# Patient Record
Sex: FEMALE | Race: WHITE | Employment: OTHER | URBAN - METROPOLITAN AREA
[De-identification: names, ages, dates, MRNs, and addresses within clinical notes are randomized per-mention and may not be internally consistent; named-entity substitution may affect disease eponyms.]

---

## 2017-10-12 ENCOUNTER — OFFICE VISIT (OUTPATIENT)
Dept: INTERNAL MEDICINE CLINIC | Age: 62
End: 2017-10-12

## 2017-10-12 VITALS
BODY MASS INDEX: 23.27 KG/M2 | HEART RATE: 78 BPM | OXYGEN SATURATION: 96 % | DIASTOLIC BLOOD PRESSURE: 88 MMHG | WEIGHT: 132 LBS | SYSTOLIC BLOOD PRESSURE: 146 MMHG

## 2017-10-12 DIAGNOSIS — L28.2 PRURITIC RASH: ICD-10-CM
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
## 2017-10-12 DIAGNOSIS — E55.9 VITAMIN D DEFICIENCY: ICD-10-CM

## 2017-10-12 DIAGNOSIS — R23.8 VESICULAR RASH: Primary | ICD-10-CM

## 2017-10-12 PROCEDURE — 99213 OFFICE O/P EST LOW 20 MIN: CPT | Performed by: INTERNAL MEDICINE

## 2017-10-12 RX ORDER — MULTIVIT-MIN/IRON/FOLIC ACID/K 18-600-40
1 CAPSULE ORAL DAILY
COMMUNITY

## 2017-10-12 RX ORDER — TRIAMCINOLONE ACETONIDE 5 MG/G
CREAM TOPICAL
Qty: 1 TUBE | Refills: 1 | Status: SHIPPED | OUTPATIENT
Start: 2017-10-12 | End: 2017-10-19

## 2017-10-12 RX ORDER — PREDNISONE 20 MG/1
40 TABLET ORAL DAILY
Qty: 10 TABLET | Refills: 0 | Status: SHIPPED | OUTPATIENT
Start: 2017-10-12 | End: 2017-10-17

## 2017-10-13 ASSESSMENT — ENCOUNTER SYMPTOMS
WHEEZING: 0
DIARRHEA: 0
SORE THROAT: 0
ABDOMINAL PAIN: 0
VOMITING: 0
TROUBLE SWALLOWING: 0
SHORTNESS OF BREATH: 0
NAUSEA: 0

## 2017-10-13 NOTE — PROGRESS NOTES
is not present. No thyromegaly present. Cardiovascular: Normal rate, regular rhythm, normal heart sounds and intact distal pulses. Exam reveals no friction rub. No murmur heard. Pulmonary/Chest: Effort normal and breath sounds normal. No respiratory distress. Abdominal: Soft. Normal appearance and bowel sounds are normal. She exhibits no distension. There is no tenderness. Musculoskeletal: Normal range of motion. She exhibits no edema. Lymphadenopathy:     She has no cervical adenopathy. Neurological: She is alert and oriented to person, place, and time. She has normal strength and normal reflexes. No cranial nerve deficit or sensory deficit. Skin: Skin is warm and dry. No rash noted. She is not diaphoretic. No cyanosis. Nails show no clubbing. Psychiatric: She has a normal mood and affect. Her speech is normal and behavior is normal.       Assessment/Plan     1. Vesicular rash  - predniSONE (DELTASONE) 20 MG tablet; Take 2 tablets by mouth daily for 5 days  Dispense: 10 tablet; Refill: 0  - triamcinolone (ARISTOCORT) 0.5 % cream; Apply topically 3 times daily. Dispense: 1 Tube; Refill: 1    2. Pruritic rash  - predniSONE (DELTASONE) 20 MG tablet; Take 2 tablets by mouth daily for 5 days  Dispense: 10 tablet; Refill: 0  - triamcinolone (ARISTOCORT) 0.5 % cream; Apply topically 3 times daily. Dispense: 1 Tube; Refill: 1    3. Bug bite, initial encounter  - predniSONE (DELTASONE) 20 MG tablet; Take 2 tablets by mouth daily for 5 days  Dispense: 10 tablet; Refill: 0  - triamcinolone (ARISTOCORT) 0.5 % cream; Apply topically 3 times daily. Dispense: 1 Tube; Refill: 1    4. Vitamin D deficiency  - Cholecalciferol (VITAMIN D) 2000 units CAPS capsule; Take 1 capsule by mouth daily      No orders of the defined types were placed in this encounter. Return if symptoms worsen or fail to improve.     Artemio Persaud MD     10/13/2017  9:39 AM    Documentation was done using voice recognition dragon software. Every effort was made to ensure accuracy; however, inadvertent unintentional computerized transcription errors may be present.

## 2018-11-07 RX ORDER — LOSARTAN POTASSIUM 25 MG/1
25 TABLET ORAL
COMMUNITY
Start: 2018-07-06 | End: 2021-08-13

## 2018-11-07 RX ORDER — ATORVASTATIN CALCIUM 10 MG/1
10 TABLET, FILM COATED ORAL
COMMUNITY
Start: 2018-07-06

## 2018-11-27 ENCOUNTER — ANESTHESIA EVENT (OUTPATIENT)
Dept: ENDOSCOPY | Age: 63
End: 2018-11-27
Payer: COMMERCIAL

## 2018-11-28 ENCOUNTER — ANESTHESIA (OUTPATIENT)
Dept: ENDOSCOPY | Age: 63
End: 2018-11-28
Payer: COMMERCIAL

## 2018-11-28 ENCOUNTER — HOSPITAL ENCOUNTER (OUTPATIENT)
Age: 63
Setting detail: OUTPATIENT SURGERY
Discharge: HOME | End: 2018-11-28
Attending: INTERNAL MEDICINE | Admitting: INTERNAL MEDICINE
Payer: COMMERCIAL

## 2018-11-28 VITALS
TEMPERATURE: 97 F | OXYGEN SATURATION: 100 % | BODY MASS INDEX: 22.2 KG/M2 | SYSTOLIC BLOOD PRESSURE: 134 MMHG | DIASTOLIC BLOOD PRESSURE: 92 MMHG | HEART RATE: 67 BPM | WEIGHT: 130 LBS | RESPIRATION RATE: 16 BRPM | HEIGHT: 64 IN

## 2018-11-28 VITALS
SYSTOLIC BLOOD PRESSURE: 114 MMHG | DIASTOLIC BLOOD PRESSURE: 80 MMHG | RESPIRATION RATE: 20 BRPM | OXYGEN SATURATION: 100 %

## 2018-11-28 PROCEDURE — 2500000003 HC RX 250 WO HCPCS: Performed by: NURSE ANESTHETIST, CERTIFIED REGISTERED

## 2018-11-28 PROCEDURE — 7100000011 HC PHASE II RECOVERY - ADDTL 15 MIN: Performed by: INTERNAL MEDICINE

## 2018-11-28 PROCEDURE — 2580000003 HC RX 258: Performed by: NURSE ANESTHETIST, CERTIFIED REGISTERED

## 2018-11-28 PROCEDURE — 3700000000 HC ANESTHESIA ATTENDED CARE: Performed by: INTERNAL MEDICINE

## 2018-11-28 PROCEDURE — 7100000010 HC PHASE II RECOVERY - FIRST 15 MIN: Performed by: INTERNAL MEDICINE

## 2018-11-28 PROCEDURE — 3700000001 HC ADD 15 MINUTES (ANESTHESIA): Performed by: INTERNAL MEDICINE

## 2018-11-28 PROCEDURE — 3609009500 HC COLONOSCOPY DIAGNOSTIC OR SCREENING: Performed by: INTERNAL MEDICINE

## 2018-11-28 PROCEDURE — 6360000002 HC RX W HCPCS: Performed by: NURSE ANESTHETIST, CERTIFIED REGISTERED

## 2018-11-28 RX ORDER — OXYCODONE HYDROCHLORIDE 5 MG/1
10 TABLET ORAL PRN
Status: DISCONTINUED | OUTPATIENT
Start: 2018-11-28 | End: 2018-11-28 | Stop reason: HOSPADM

## 2018-11-28 RX ORDER — SODIUM CHLORIDE 9 MG/ML
INJECTION, SOLUTION INTRAVENOUS CONTINUOUS PRN
Status: DISCONTINUED | OUTPATIENT
Start: 2018-11-28 | End: 2018-11-28 | Stop reason: SDUPTHER

## 2018-11-28 RX ORDER — LIDOCAINE HYDROCHLORIDE 20 MG/ML
INJECTION, SOLUTION INFILTRATION; PERINEURAL PRN
Status: DISCONTINUED | OUTPATIENT
Start: 2018-11-28 | End: 2018-11-28 | Stop reason: SDUPTHER

## 2018-11-28 RX ORDER — PROPOFOL 10 MG/ML
INJECTION, EMULSION INTRAVENOUS PRN
Status: DISCONTINUED | OUTPATIENT
Start: 2018-11-28 | End: 2018-11-28 | Stop reason: SDUPTHER

## 2018-11-28 RX ORDER — LABETALOL HYDROCHLORIDE 5 MG/ML
5 INJECTION, SOLUTION INTRAVENOUS EVERY 10 MIN PRN
Status: DISCONTINUED | OUTPATIENT
Start: 2018-11-28 | End: 2018-11-28 | Stop reason: HOSPADM

## 2018-11-28 RX ORDER — OXYCODONE HYDROCHLORIDE 5 MG/1
5 TABLET ORAL PRN
Status: DISCONTINUED | OUTPATIENT
Start: 2018-11-28 | End: 2018-11-28 | Stop reason: HOSPADM

## 2018-11-28 RX ORDER — MEPERIDINE HYDROCHLORIDE 25 MG/ML
12.5 INJECTION INTRAMUSCULAR; INTRAVENOUS; SUBCUTANEOUS EVERY 5 MIN PRN
Status: DISCONTINUED | OUTPATIENT
Start: 2018-11-28 | End: 2018-11-28 | Stop reason: HOSPADM

## 2018-11-28 RX ORDER — PROMETHAZINE HYDROCHLORIDE 25 MG/ML
6.25 INJECTION, SOLUTION INTRAMUSCULAR; INTRAVENOUS
Status: DISCONTINUED | OUTPATIENT
Start: 2018-11-28 | End: 2018-11-28 | Stop reason: HOSPADM

## 2018-11-28 RX ORDER — SODIUM CHLORIDE 9 MG/ML
INJECTION, SOLUTION INTRAVENOUS CONTINUOUS
Status: DISCONTINUED | OUTPATIENT
Start: 2018-11-28 | End: 2018-11-28 | Stop reason: HOSPADM

## 2018-11-28 RX ORDER — MORPHINE SULFATE 4 MG/ML
1 INJECTION, SOLUTION INTRAMUSCULAR; INTRAVENOUS EVERY 5 MIN PRN
Status: DISCONTINUED | OUTPATIENT
Start: 2018-11-28 | End: 2018-11-28 | Stop reason: HOSPADM

## 2018-11-28 RX ORDER — SODIUM CHLORIDE 0.9 % (FLUSH) 0.9 %
10 SYRINGE (ML) INJECTION EVERY 12 HOURS SCHEDULED
Status: DISCONTINUED | OUTPATIENT
Start: 2018-11-28 | End: 2018-11-28 | Stop reason: HOSPADM

## 2018-11-28 RX ORDER — HYDRALAZINE HYDROCHLORIDE 20 MG/ML
5 INJECTION INTRAMUSCULAR; INTRAVENOUS EVERY 10 MIN PRN
Status: DISCONTINUED | OUTPATIENT
Start: 2018-11-28 | End: 2018-11-28 | Stop reason: HOSPADM

## 2018-11-28 RX ORDER — DIPHENHYDRAMINE HYDROCHLORIDE 50 MG/ML
12.5 INJECTION INTRAMUSCULAR; INTRAVENOUS
Status: DISCONTINUED | OUTPATIENT
Start: 2018-11-28 | End: 2018-11-28 | Stop reason: HOSPADM

## 2018-11-28 RX ORDER — METOCLOPRAMIDE HYDROCHLORIDE 5 MG/ML
10 INJECTION INTRAMUSCULAR; INTRAVENOUS
Status: DISCONTINUED | OUTPATIENT
Start: 2018-11-28 | End: 2018-11-28 | Stop reason: HOSPADM

## 2018-11-28 RX ORDER — SODIUM CHLORIDE 0.9 % (FLUSH) 0.9 %
10 SYRINGE (ML) INJECTION PRN
Status: DISCONTINUED | OUTPATIENT
Start: 2018-11-28 | End: 2018-11-28 | Stop reason: HOSPADM

## 2018-11-28 RX ADMIN — SODIUM CHLORIDE: 9 INJECTION, SOLUTION INTRAVENOUS at 11:20

## 2018-11-28 RX ADMIN — PROPOFOL 20 MG: 10 INJECTION, EMULSION INTRAVENOUS at 11:35

## 2018-11-28 RX ADMIN — LIDOCAINE HYDROCHLORIDE 50 MG: 20 INJECTION, SOLUTION INFILTRATION; PERINEURAL at 11:25

## 2018-11-28 RX ADMIN — PROPOFOL 20 MG: 10 INJECTION, EMULSION INTRAVENOUS at 11:54

## 2018-11-28 RX ADMIN — PROPOFOL 80 MG: 10 INJECTION, EMULSION INTRAVENOUS at 11:40

## 2018-11-28 RX ADMIN — PROPOFOL 180 MG: 10 INJECTION, EMULSION INTRAVENOUS at 11:25

## 2018-11-28 ASSESSMENT — PAIN SCALES - WONG BAKER: WONGBAKER_NUMERICALRESPONSE: 0

## 2018-11-28 ASSESSMENT — PAIN SCALES - GENERAL
PAINLEVEL_OUTOF10: 0
PAINLEVEL_OUTOF10: 0

## 2021-08-13 ENCOUNTER — APPOINTMENT (OUTPATIENT)
Dept: CT IMAGING | Age: 66
End: 2021-08-13
Payer: MEDICARE

## 2021-08-13 ENCOUNTER — HOSPITAL ENCOUNTER (OUTPATIENT)
Age: 66
Setting detail: OBSERVATION
Discharge: HOME | End: 2021-08-15
Attending: EMERGENCY MEDICINE | Admitting: INTERNAL MEDICINE
Payer: MEDICARE

## 2021-08-13 ENCOUNTER — APPOINTMENT (OUTPATIENT)
Dept: GENERAL RADIOLOGY | Age: 66
End: 2021-08-13
Payer: MEDICARE

## 2021-08-13 DIAGNOSIS — R55 SYNCOPE AND COLLAPSE: Primary | ICD-10-CM

## 2021-08-13 LAB
ANION GAP SERPL CALCULATED.3IONS-SCNC: 15 MMOL/L (ref 3–16)
BASOPHILS ABSOLUTE: 0 K/UL (ref 0–0.2)
BASOPHILS RELATIVE PERCENT: 0.4 %
BUN BLDV-MCNC: 14 MG/DL (ref 7–20)
CALCIUM SERPL-MCNC: 9 MG/DL (ref 8.3–10.6)
CHLORIDE BLD-SCNC: 94 MMOL/L (ref 99–110)
CO2: 22 MMOL/L (ref 21–32)
CREAT SERPL-MCNC: 0.6 MG/DL (ref 0.6–1.2)
EOSINOPHILS ABSOLUTE: 0.1 K/UL (ref 0–0.6)
EOSINOPHILS RELATIVE PERCENT: 1.1 %
GFR AFRICAN AMERICAN: >60
GFR NON-AFRICAN AMERICAN: >60
GLUCOSE BLD-MCNC: 133 MG/DL (ref 70–99)
HCT VFR BLD CALC: 36.2 % (ref 36–48)
HEMOGLOBIN: 12.7 G/DL (ref 12–16)
LYMPHOCYTES ABSOLUTE: 2.2 K/UL (ref 1–5.1)
LYMPHOCYTES RELATIVE PERCENT: 35.9 %
MAGNESIUM: 2.5 MG/DL (ref 1.8–2.4)
MCH RBC QN AUTO: 33 PG (ref 26–34)
MCHC RBC AUTO-ENTMCNC: 35.1 G/DL (ref 31–36)
MCV RBC AUTO: 94.1 FL (ref 80–100)
MONOCYTES ABSOLUTE: 0.5 K/UL (ref 0–1.3)
MONOCYTES RELATIVE PERCENT: 8.8 %
NEUTROPHILS ABSOLUTE: 3.3 K/UL (ref 1.7–7.7)
NEUTROPHILS RELATIVE PERCENT: 53.8 %
PDW BLD-RTO: 13.1 % (ref 12.4–15.4)
PLATELET # BLD: 271 K/UL (ref 135–450)
PMV BLD AUTO: 7.1 FL (ref 5–10.5)
POTASSIUM REFLEX MAGNESIUM: 3.5 MMOL/L (ref 3.5–5.1)
RBC # BLD: 3.84 M/UL (ref 4–5.2)
SODIUM BLD-SCNC: 131 MMOL/L (ref 136–145)
TROPONIN: <0.01 NG/ML
WBC # BLD: 6.1 K/UL (ref 4–11)

## 2021-08-13 PROCEDURE — 2580000003 HC RX 258: Performed by: EMERGENCY MEDICINE

## 2021-08-13 PROCEDURE — 99284 EMERGENCY DEPT VISIT MOD MDM: CPT

## 2021-08-13 PROCEDURE — 6360000004 HC RX CONTRAST MEDICATION: Performed by: EMERGENCY MEDICINE

## 2021-08-13 PROCEDURE — 83735 ASSAY OF MAGNESIUM: CPT

## 2021-08-13 PROCEDURE — 71260 CT THORAX DX C+: CPT

## 2021-08-13 PROCEDURE — 80048 BASIC METABOLIC PNL TOTAL CA: CPT

## 2021-08-13 PROCEDURE — 93005 ELECTROCARDIOGRAM TRACING: CPT | Performed by: EMERGENCY MEDICINE

## 2021-08-13 PROCEDURE — 70450 CT HEAD/BRAIN W/O DYE: CPT

## 2021-08-13 PROCEDURE — 85025 COMPLETE CBC W/AUTO DIFF WBC: CPT

## 2021-08-13 PROCEDURE — 71045 X-RAY EXAM CHEST 1 VIEW: CPT

## 2021-08-13 PROCEDURE — 36415 COLL VENOUS BLD VENIPUNCTURE: CPT

## 2021-08-13 PROCEDURE — G0378 HOSPITAL OBSERVATION PER HR: HCPCS

## 2021-08-13 PROCEDURE — 84484 ASSAY OF TROPONIN QUANT: CPT

## 2021-08-13 RX ORDER — 0.9 % SODIUM CHLORIDE 0.9 %
500 INTRAVENOUS SOLUTION INTRAVENOUS ONCE
Status: COMPLETED | OUTPATIENT
Start: 2021-08-13 | End: 2021-08-14

## 2021-08-13 RX ORDER — OLMESARTAN MEDOXOMIL 20 MG/1
5 TABLET ORAL DAILY
COMMUNITY
Start: 2021-07-18 | End: 2022-10-19 | Stop reason: DRUGHIGH

## 2021-08-13 RX ADMIN — IOPAMIDOL 75 ML: 755 INJECTION, SOLUTION INTRAVENOUS at 21:59

## 2021-08-13 RX ADMIN — SODIUM CHLORIDE 500 ML: 9 INJECTION, SOLUTION INTRAVENOUS at 23:17

## 2021-08-13 ASSESSMENT — ENCOUNTER SYMPTOMS
COUGH: 0
CHEST TIGHTNESS: 0
SHORTNESS OF BREATH: 0
VOMITING: 0
ABDOMINAL PAIN: 0
SORE THROAT: 0
NAUSEA: 0
DIARRHEA: 0
RESPIRATORY NEGATIVE: 1

## 2021-08-13 ASSESSMENT — PAIN SCALES - GENERAL
PAINLEVEL_OUTOF10: 0
PAINLEVEL_OUTOF10: 0

## 2021-08-14 PROBLEM — E87.1 HYPONATREMIA: Status: ACTIVE | Noted: 2021-08-14

## 2021-08-14 LAB
EKG ATRIAL RATE: 66 BPM
EKG DIAGNOSIS: NORMAL
EKG P AXIS: -17 DEGREES
EKG P-R INTERVAL: 174 MS
EKG Q-T INTERVAL: 376 MS
EKG QRS DURATION: 94 MS
EKG QTC CALCULATION (BAZETT): 394 MS
EKG R AXIS: 10 DEGREES
EKG T AXIS: 19 DEGREES
EKG VENTRICULAR RATE: 66 BPM
LV EF: 53 %
LVEF MODALITY: NORMAL
TROPONIN: <0.01 NG/ML

## 2021-08-14 PROCEDURE — 93306 TTE W/DOPPLER COMPLETE: CPT

## 2021-08-14 PROCEDURE — 6370000000 HC RX 637 (ALT 250 FOR IP): Performed by: NURSE PRACTITIONER

## 2021-08-14 PROCEDURE — G0378 HOSPITAL OBSERVATION PER HR: HCPCS

## 2021-08-14 PROCEDURE — 84484 ASSAY OF TROPONIN QUANT: CPT

## 2021-08-14 PROCEDURE — 36415 COLL VENOUS BLD VENIPUNCTURE: CPT

## 2021-08-14 PROCEDURE — 2580000003 HC RX 258: Performed by: NURSE PRACTITIONER

## 2021-08-14 PROCEDURE — 94760 N-INVAS EAR/PLS OXIMETRY 1: CPT

## 2021-08-14 PROCEDURE — 93010 ELECTROCARDIOGRAM REPORT: CPT | Performed by: INTERNAL MEDICINE

## 2021-08-14 PROCEDURE — 99204 OFFICE O/P NEW MOD 45 MIN: CPT | Performed by: INTERNAL MEDICINE

## 2021-08-14 PROCEDURE — 96372 THER/PROPH/DIAG INJ SC/IM: CPT

## 2021-08-14 PROCEDURE — 6360000002 HC RX W HCPCS: Performed by: NURSE PRACTITIONER

## 2021-08-14 RX ORDER — ACETAMINOPHEN 325 MG/1
650 TABLET ORAL EVERY 6 HOURS PRN
Status: DISCONTINUED | OUTPATIENT
Start: 2021-08-14 | End: 2021-08-15 | Stop reason: HOSPADM

## 2021-08-14 RX ORDER — LOSARTAN POTASSIUM 50 MG/1
50 TABLET ORAL DAILY
Status: DISCONTINUED | OUTPATIENT
Start: 2021-08-14 | End: 2021-08-15 | Stop reason: HOSPADM

## 2021-08-14 RX ORDER — ONDANSETRON 4 MG/1
4 TABLET, ORALLY DISINTEGRATING ORAL EVERY 8 HOURS PRN
Status: DISCONTINUED | OUTPATIENT
Start: 2021-08-14 | End: 2021-08-15 | Stop reason: HOSPADM

## 2021-08-14 RX ORDER — SODIUM CHLORIDE 9 MG/ML
25 INJECTION, SOLUTION INTRAVENOUS PRN
Status: DISCONTINUED | OUTPATIENT
Start: 2021-08-14 | End: 2021-08-15 | Stop reason: HOSPADM

## 2021-08-14 RX ORDER — ONDANSETRON 2 MG/ML
4 INJECTION INTRAMUSCULAR; INTRAVENOUS EVERY 6 HOURS PRN
Status: DISCONTINUED | OUTPATIENT
Start: 2021-08-14 | End: 2021-08-15 | Stop reason: HOSPADM

## 2021-08-14 RX ORDER — ACETAMINOPHEN 650 MG/1
650 SUPPOSITORY RECTAL EVERY 6 HOURS PRN
Status: DISCONTINUED | OUTPATIENT
Start: 2021-08-14 | End: 2021-08-15 | Stop reason: HOSPADM

## 2021-08-14 RX ORDER — CARBAMAZEPINE 300 MG/1
300 CAPSULE, EXTENDED RELEASE ORAL 2 TIMES DAILY
Status: DISCONTINUED | OUTPATIENT
Start: 2021-08-14 | End: 2021-08-15 | Stop reason: HOSPADM

## 2021-08-14 RX ORDER — SODIUM CHLORIDE 0.9 % (FLUSH) 0.9 %
5-40 SYRINGE (ML) INJECTION EVERY 12 HOURS SCHEDULED
Status: DISCONTINUED | OUTPATIENT
Start: 2021-08-14 | End: 2021-08-15 | Stop reason: HOSPADM

## 2021-08-14 RX ORDER — ATORVASTATIN CALCIUM 10 MG/1
10 TABLET, FILM COATED ORAL DAILY
Status: DISCONTINUED | OUTPATIENT
Start: 2021-08-14 | End: 2021-08-15 | Stop reason: HOSPADM

## 2021-08-14 RX ORDER — SODIUM CHLORIDE 0.9 % (FLUSH) 0.9 %
5-40 SYRINGE (ML) INJECTION PRN
Status: DISCONTINUED | OUTPATIENT
Start: 2021-08-14 | End: 2021-08-15 | Stop reason: HOSPADM

## 2021-08-14 RX ORDER — POLYETHYLENE GLYCOL 3350 17 G/17G
17 POWDER, FOR SOLUTION ORAL DAILY PRN
Status: DISCONTINUED | OUTPATIENT
Start: 2021-08-14 | End: 2021-08-15 | Stop reason: HOSPADM

## 2021-08-14 RX ADMIN — LOSARTAN POTASSIUM 50 MG: 50 TABLET, FILM COATED ORAL at 08:51

## 2021-08-14 RX ADMIN — Medication 10 ML: at 19:52

## 2021-08-14 RX ADMIN — Medication 10 ML: at 08:52

## 2021-08-14 RX ADMIN — ATORVASTATIN CALCIUM 10 MG: 10 TABLET, FILM COATED ORAL at 08:51

## 2021-08-14 RX ADMIN — ENOXAPARIN SODIUM 40 MG: 40 INJECTION SUBCUTANEOUS at 08:51

## 2021-08-14 RX ADMIN — CARBAMAZEPINE 300 MG: 300 CAPSULE, EXTENDED RELEASE ORAL at 09:46

## 2021-08-14 RX ADMIN — CARBAMAZEPINE 300 MG: 300 CAPSULE, EXTENDED RELEASE ORAL at 19:52

## 2021-08-14 ASSESSMENT — PAIN SCALES - GENERAL
PAINLEVEL_OUTOF10: 0

## 2021-08-14 NOTE — PLAN OF CARE
Problem: Safety:  Goal: Free from accidental physical injury  Description: Free from accidental physical injury  Outcome: Ongoing  Goal: Free from intentional harm  Description: Free from intentional harm  Outcome: Ongoing     Problem: Discharge Planning:  Goal: Patients continuum of care needs are met  Description: Patients continuum of care needs are met  Outcome: Ongoing

## 2021-08-14 NOTE — PROGRESS NOTES
4 Eyes Skin Assessment     NAME:  Oswaldo Parks  YOB: 1955  MEDICAL RECORD NUMBER:  [id number]    The patient is being assess for  Admission    I agree that 2 RN's have performed a thorough Head to Toe Skin Assessment on the patient. ALL assessment sites listed below have been assessed. Areas assessed by both nurses:    Head, Face, Ears, Shoulders, Back, Chest, Arms, Elbows, Hands, Sacrum. Buttock, Coccyx, Ischium and Legs. Feet and Heels        Does the Patient have a Wound?  No noted wound(s)       Oscar Prevention initiated:  NA   Wound Care Orders initiated:  NA    Pressure Injury (Stage 3,4, Unstageable, DTI, NWPT, and Complex wounds) if present place consult order under [de-identified] NA    New and Established Ostomies if present place consult order under : NA      Nurse 1 eSignature: Electronically signed by Blake Sewell RN on 8/14/21 at 2:18 AM EDT    **SHARE this note so that the co-signing nurse is able to place an eSignature**    Nurse 2 eSignature: Electronically signed by Milly Carlson RN on 8/14/21 at 2:19 AM EDT

## 2021-08-14 NOTE — PLAN OF CARE
Problem: Safety:  Goal: Free from accidental physical injury  Description: Free from accidental physical injury  8/14/2021 1001 by Keli Cordero RN  Outcome: Ongoing  8/14/2021 0207 by Any Carbajal RN  Outcome: Ongoing  Goal: Free from intentional harm  Description: Free from intentional harm  8/14/2021 1001 by Keli Cordero RN  Outcome: Ongoing  8/14/2021 0207 by Any Carbajal RN  Outcome: Ongoing

## 2021-08-14 NOTE — ED NOTES
ED SBAR report provider to [facility] EDELMIRA SERARNO, RN. Patient to be transported to Room 4109 via stretcher by RN. Patient transported with bedside cardiac monitor and with IV medications infusing. IV site clean, dry, and intact. MEWS score and pain assessed and documented. Updated patient and family on plan of care.      [address] [facility] [address], RN  08/13/21 8677

## 2021-08-14 NOTE — PROGRESS NOTES
Patient alert and oriented x4, VSS, sitting up in bed finishing breakfast. Patient denies n/v, diarrhea, SOB, pain. Patient states no dizziness or lightheadedness, c/o slight headache but denies need for prn Tylenol. Patient is UAL and tolerating well, calls appropriately. Non-slip socks on. Bed in in lowest and locked position, call light within reach. Will continue to monitor pt needs. none/denies family hx

## 2021-08-14 NOTE — PROGRESS NOTES
Patient transferred from ED. Patient alert and orient Able to able to ed from stretcher with out assistance. VSS. NS on tele. Oriented patient to room. All questions answered will continue to monitor.

## 2021-08-14 NOTE — PROGRESS NOTES
Hospitalist Progress Note      PCP: Richelle Sarmiento    Date of Admission: 8/13/2021        Subjective:     Doing well this morning. No dizziness or lightheadedness. No episodes of fainting or seizures since admission. Medications:  Reviewed    Infusion Medications    sodium chloride       Scheduled Medications    atorvastatin  10 mg Oral Daily    losartan  50 mg Oral Daily    carBAMazepine  300 mg Oral BID    sodium chloride flush  5-40 mL Intravenous 2 times per day    enoxaparin  40 mg Subcutaneous Daily     PRN Meds: sodium chloride flush, sodium chloride, ondansetron **OR** ondansetron, polyethylene glycol, acetaminophen **OR** acetaminophen, perflutren lipid microspheres      Intake/Output Summary (Last 24 hours) at 8/14/2021 0820  Last data filed at 8/14/2021 0031  Gross per 24 hour   Intake 300 ml   Output --   Net 300 ml       Exam:    BP (!) 143/90   Pulse 64   Temp 97.6 °F (36.4 °C) (Oral)   Resp 16   Ht 5' 4\" (1.626 m)   Wt 133 lb 13.1 oz (60.7 kg)   SpO2 100%   BMI 22.97 kg/m²     General appearance: No apparent distress, appears stated age and cooperative. HEENT: Pupils equal, round, and reactive to light. Conjunctivae/corneas clear. Neck: Supple, with full range of motion. No jugular venous distention. Trachea midline. Respiratory:  Normal respiratory effort. Clear to auscultation, bilaterally without Rales/Wheezes/Rhonchi. Cardiovascular: Regular rate and rhythm with normal S1/S2 without murmurs, rubs or gallops. Abdomen: Soft, non-tender, non-distended with normal bowel sounds. Musculoskeletal: No clubbing, cyanosis or edema bilaterally. Full range of motion without deformity. Skin: Skin color, texture, turgor normal.  No rashes or lesions. Neurologic:  Neurovascularly intact without any focal sensory/motor deficits.  Cranial nerves: II-XII intact, grossly non-focal.  Psychiatric: Alert and oriented, thought content appropriate, normal insight  Capillary Refill: Brisk,< 3 seconds   Peripheral Pulses: +2 palpable, equal bilaterally       Labs:   Recent Labs     08/13/21 2045   WBC 6.1   HGB 12.7   HCT 36.2        Recent Labs     08/13/21 2045   *   K 3.5   CL 94*   CO2 22   BUN 14   CREATININE 0.6   CALCIUM 9.0     No results for input(s): AST, ALT, BILIDIR, BILITOT, ALKPHOS in the last 72 hours. No results for input(s): INR in the last 72 hours. Recent Labs     08/13/21 2045 08/14/21  0518   TROPONINI <0.01 <0.01       Assessment/Plan:    -Syncope and collapse. .. Suspect neurocardiogenic. .. Echocardiogram pending. . Continue to monitor on telemetry. Check orthostatic vitals. Consult cardiology    -Seizure order. .. Episode described on presentation was not seizure. . Continue Tegretol    -Hyponatremia-131-patient received IV fluids--monitor sodium levels      -Essential hypertension-stable-continue losartan    -Hyperlipidemia-continue statin    Disposition. . Home once cleared by cardiology      DVT Prophylaxis: Lovenox  Diet: ADULT DIET;  Regular  Code Status: Full Code      Med Barnes MD

## 2021-08-14 NOTE — H&P
Hospital Medicine History & Physical      PCP: Berta Martinez    Date of Admission: 8/13/2021    Date of Service: Pt seen/examined on 8/13/2021 and Admitted to Inpatient  Placed in Observation. Chief Complaint:  Syncope, possible sz      History Of Present Illness: The patient is a 77 y.o. female who presents to [facility] with PMHx: Sleep apnea, migraines, seizures, hypertension, hyperlipidemia    Patient and her  were at [facility] here locally celebrating her birthday dinner. He reports that she suddenly became unresponsive and dropped her fork. He said that her head went back eyes closed and she was breathing. He was not sure she had a pulse but she became severely diaphoretic. He felt like he tried to arouse her for several minutes and then suddenly she aroused on she was alert and oriented to person and place and aware of her location. Patient's last seizure was in 2006 [place] Patient reports that she recalls the events of this afternoon. She states that it must of hit her without warning because she had no prodromal symptoms such as lightheadedness dizziness chest pain or weakness. She states that she felt nauseous and dizzy following the event.  reports that when EMS arrived her blood glucose was 149. ED work-up: No evidence of elevation of troponin, EKG unremarkable. Her vital signs were stable. Her telemetry monitoring was without evidence of arrhythmia. CT chest negative for acute findings. Admission was requested for syncope versus seizure work-up. On my exam the patient is awake alert and oriented. No evidence of distress. And she is positive for cardiac murmur that is not consistent with her history. She denies ever being told that she had a murmur. Lung fields were clear.   No evidence of peripheral edema. Past Medical History:        Diagnosis Date    Epilepsy ([address]     Hyperlipidemia     Hypertension     Migraines     Sleep apnea     cpap       Past Surgical History:        Procedure Laterality Date    ND COLONOSCOPY FLX DX W/COLLJ SPEC WHEN PFRMD N/A 11/28/2018    COLONOSCOPY DIAGNOSTIC OR SCREENING performed by Cassy Ramirez MD at [address] [place] [address]  as a child       Medications Prior to Admission:    Prior to Admission medications    Medication Sig Start Date End Date Taking? Authorizing Provider   atorvastatin (LIPITOR) 10 MG tablet Take 10 mg by mouth 7/6/18  Yes Historical Provider, MD   Cholecalciferol (VITAMIN D) 2000 units CAPS capsule Take 1 capsule by mouth daily   Yes Historical Provider, MD   CARBATROL 300 MG CR capsule Take 1 capsule by mouth 2 times daily. 10/11/12  Yes Mario Bentley MD   Multiple Vitamin (MULTIVITAMIN PO) Take  by mouth. Yes Historical Provider, MD   MAGNESIUM PO Take 400 mg by mouth daily. Yes Historical Provider, MD   olmesartan (BENICAR) 20 MG tablet TAKE 1 TABLET BY MOUTH EVERY DAY 7/18/21   Historical Provider, MD       Allergies:  Erythromycin    Social History:  The patient currently lives at home with spouse    TOBACCO:   reports that she has never smoked. She has never used smokeless tobacco.  ETOH:   reports no history of alcohol use. Family History:  Reviewed in detail and negative for DM, Early CAD, Cancer, CVA. Positive as follows:        Problem Relation Age of Onset    Prostate Cancer Father     Heart Disease Father     Cancer Father     Heart Attack Mother 46    Heart Surgery Mother 64        CABG    Heart Disease Mother     Schizophrenia Brother     Hypertension Other     High Cholesterol Other     Heart Disease Other     Migraines Other     Parkinsonism Other     Colon Cancer Other        REVIEW OF SYSTEMS:    as noted in the HPI.  All other systems reviewed and negative. PHYSICAL EXAM:    /81   Pulse 75   Temp 98.1 °F (36.7 °C) (Oral)   Resp 16   Ht 5' 4\" (1.626 m)   Wt 133 lb 6.1 oz (60.5 kg)   SpO2 100%   BMI 22.89 kg/m²     General appearance: No apparent distress appears stated age and cooperative. HEENT Normal cephalic, atraumatic without obvious deformity. Pupils equal, round, and reactive to light. Extra ocular muscles intact. Conjunctivae/corneas clear. Neck: Supple, No jugular venous distention/bruits. Trachea midline without thyromegaly or adenopathy with full range of motion. Lungs: Clear to auscultation, bilaterally without Rales/Wheezes/Rhonchi with good respiratory effort. Heart: Positive murmur grade 3/6, left anterior axillary and Erb's point no thrill. No rub or gallop noted. No evidence of JVD. Regular rate and rhythm. No evidence of peripheral edema. Abdomen: Soft, non-tender or non-distended without rigidity or guarding and positive bowel sounds all four quadrants. Extremities: No clubbing, cyanosis, or edema bilaterally. Full range of motion without deformity and normal gait intact. Skin: Skin color, texture, turgor normal.  No rashes or lesions. Neurologic: Alert and oriented X 3, neurovascularly intact with sensory/motor intact upper extremities/lower extremities, bilaterally. Cranial nerves: II-XII intact, grossly non-focal.  Mental status: Alert, oriented, thought content appropriate.   Capillary Refill: Acceptable  < 3 seconds  Peripheral Pulses: +3 Easily felt, not easily obliterated with pressure      CXR:  I have reviewed the CXR with the following interpretation: NAD  EKG:  I have reviewed the EKG with the following interpretation: Sinus rhythm and SST, rate 66, , QRS 9 4,     CT head: NAD  CT chest: No PE, no acute disease    CBC   Recent Labs     08/13/21 2045   WBC 6.1   HGB 12.7   HCT 36.2         RENAL  Recent Labs     08/13/21 2045   *   K 3.5   CL 94*   CO2 22   BUN 14 CREATININE 0.6     LFT'S  No results for input(s): AST, ALT, ALB, BILIDIR, BILITOT, ALKPHOS in the last 72 hours. COAG  No results for input(s): INR in the last 72 hours. CARDIAC ENZYMES  Recent Labs     08/13/21 2045   TROPONINI <0.01       U/A:  No results found for: NITRITE, COLORU, WBCUA, RBCUA, MUCUS, BACTERIA, CLARITYU, SPECGRAV, LEUKOCYTESUR, BLOODU, GLUCOSEU, AMORPHOUS    ABG  No results found for: DQP5TJZ, BEART, Z9EYYLAF, PHART, THGBART, TJR9KBI, PO2ART, GWF4FWQ        Active Hospital Problems    Diagnosis Date Noted    Syncope and collapse [R55] 08/13/2021         PHYSICIANS CERTIFICATION:    I certify that Kiran Medellin is expected to be hospitalized for less than 2 midnights based on the following assessment and plan:      ASSESSMENT/PLAN:  Syncope vs sz: No prodromal onset, occurred without warning, no post ictal phase, no tonic-clonic movement  Severe diaphoresis weakness and nausea after  Seems to be consistent with a cardiogenic syncope. DDx: Tachyarrhythmia, bradycardia arrhythmia, SSS  TIA or CVA could be considered however there was no neurological derangement postevent, no hemiparesis or paralysis so less likely CVA or TIA  Does not sound seizure. Seizure precaution  Telemetry monitoring  Serial troponin  EKG in a.m. Cardiac murmur: new clinical exam finding: no history of prior  Echocardiogram in a.m. History of seizure: Continue antiepileptic med  History hypertension: Continue losartan, continue statin      DVT Prophylaxis: Lovenox  Diet: ADULT DIET; Regular  Code Status: Full Code  PT/OT Eval Status: Independent    Dispo -admit, observation       [address] [facility], APRN - CNP    Thank you Judi Huitron for the opportunity to be involved in this patient's care. If you have any questions or concerns please feel free to contact me at [phone].

## 2021-08-14 NOTE — ED NOTES
Pt to ED via EMS after becoming unresponsive at dinner. Per pt  they were at dinner and the wife stated \"we should leave, Im not feeling well\". Several minutes later the patients head fell back and she was unconscious and extremely diaphoretic. Per pt  pt was out for about 7 minutes. Pt states she does not remember the event. Pt does have hx of epilepsy and is on medication. He rlast seizure was in 2006.  states this was not like her seizures in the past. Pt is now a/ox4. VSS. No complaints at this time. Placed on monitor.       Dianne Humphries RN  08/13/21 2100

## 2021-08-14 NOTE — ED PROVIDER NOTES
[facility]  EMERGENCY DEPARTMENTENCOUNTER      Pt Name: Saira Drew  MRN: [id number]  [place] 1955  Date ofevaluation: 8/13/2021  Provider: Karly Morton MD    CHIEF COMPLAINT       Chief Complaint   Patient presents with    Loss of Consciousness     Pt lost conciousness while eating dinner. Per  pt was unresponsive for over 5 minutes. HISTORY OF PRESENT ILLNESS   (Location/Symptom, Timing/Onset,Context/Setting, Quality, Duration, Modifying Factors, Severity)  Note limiting factors. Saira Drew is a 77 y.o. female  who  has a past medical history of Epilepsy ([facility] [address] Hyperlipidemia, Hypertension, Migraines, and Sleep apnea. 78-year-old female who presents after potential syncopal episode versus seizure which occurred at dinner. Patient was otherwise feeling normal when she became unresponsive for several minutes per family.  witnessed it and states that she was diaphoretic cool and clammy. When she woke up she was her normal self and did not remember what happened. No trauma should not hit her head. She otherwise has no symptoms at this time. This is never occurred before. She does have a history of seizures but her seizures are usually [place] and this was not similar to any prior seizure. Her last seizure was in 2006 she has been taking her antiepileptic with no problems for many years and has not missed any doses. Denies any and all other symptoms at this time. Denies any fever, nausea, vomiting, diarrhea, chest pain, shortness of breath, dysuria, hematuria, back pain. NursingNotes were reviewed. REVIEW OF SYSTEMS    (2-9 systems for level 4, 10 or more for level 5)     Review of Systems   Constitutional: Negative. Negative for chills, diaphoresis, fatigue and fever. HENT: Negative. Negative for sore throat. Respiratory: Negative. Negative for cough, chest tightness and shortness of breath. Cardiovascular: Negative. Negative for chest pain, palpitations and leg swelling. Gastrointestinal: Negative for abdominal pain, diarrhea, nausea and vomiting. Genitourinary: Negative. Musculoskeletal: Negative. Skin: Negative. Neurological: Positive for syncope. Negative for light-headedness and headaches. Hematological: Negative. Does not bruise/bleed easily. Except as noted above the remainder of the review of systems was reviewed and negative. PAST MEDICAL HISTORY     Past Medical History:   Diagnosis Date    Epilepsy ([facility] [address]     Hyperlipidemia     Hypertension     Migraines     Sleep apnea     cpap         SURGICALHISTORY       Past Surgical History:   Procedure Laterality Date    TX COLONOSCOPY FLX DX W/COLLJ SPEC WHEN PFRMD N/A 11/28/2018    COLONOSCOPY DIAGNOSTIC OR SCREENING performed by Thea Kirby MD at [facility]  as a child         CURRENT MEDICATIONS       Previous Medications    ATORVASTATIN (LIPITOR) 10 MG TABLET    Take 10 mg by mouth    CARBATROL 300 MG CR CAPSULE    Take 1 capsule by mouth 2 times daily. CHOLECALCIFEROL (VITAMIN D) 2000 UNITS CAPS CAPSULE    Take 1 capsule by mouth daily    MAGNESIUM PO    Take 400 mg by mouth daily. MULTIPLE VITAMIN (MULTIVITAMIN PO)    Take  by mouth.       OLMESARTAN (BENICAR) 20 MG TABLET    TAKE 1 TABLET BY MOUTH EVERY DAY            Erythromycin    FAMILY HISTORY       Family History   Problem Relation Age of Onset    Prostate Cancer Father     Heart Disease Father     Cancer Father     Heart Attack Mother 46    Heart Surgery Mother 64        CABG    Heart Disease Mother     Schizophrenia Brother     Hypertension Other     High Cholesterol Other     Heart Disease Other     Migraines Other     Parkinsonism Other     Colon Cancer Other           SOCIAL HISTORY       Social History     Socioeconomic History    Marital status:      Spouse name: None    Number of children: None    Years of education: None    Highest education level: None   Occupational History    None   Tobacco Use    Smoking status: Never Smoker    Smokeless tobacco: Never Used   Substance and Sexual Activity    Alcohol use: No    Drug use: No    Sexual activity: None   Other Topics Concern    None   Social History Narrative    None     Social Determinants of Health     Financial Resource Strain:     Difficulty of Paying Living Expenses:    Food Insecurity:     Worried About Running Out of Food in the Last Year:     Ran Out of Food in the Last Year:    Transportation Needs:     Lack of Transportation (Medical):  Lack of Transportation (Non-Medical):    Physical Activity:     Days of Exercise per Week:     Minutes of Exercise per Session:    Stress:     Feeling of Stress :    Social Connections:     Frequency of Communication with Friends and Family:     Frequency of Social Gatherings with Friends and Family:     Attends [employer] Services:     Active Member of Clubs or Organizations:     Attends Club or Organization Meetings:     Marital Status:    Intimate Partner Violence:     Fear of Current or Ex-Partner:     Emotionally Abused:     Physically Abused:     Sexually Abused:        SCREENINGS             PHYSICAL EXAM    (up to 7 for level 4, 8 or more for level 5)     ED Triage Vitals [08/13/21 2036]   BP Temp Temp Source Pulse Resp SpO2 Height Weight   110/79 97.9 °F (36.6 °C) Oral 66 14 100 % 5' 4\" (1.626 m) 133 lb 6.1 oz (60.5 kg)       Physical Exam  Vitals and nursing note reviewed. Constitutional:       General: She is not in acute distress. Appearance: Normal appearance. She is not toxic-appearing or diaphoretic. HENT:      Head: Normocephalic and atraumatic.       Right Ear: Tympanic membrane and external ear normal.      Left Ear: Tympanic membrane and external ear normal.      Nose: Nose normal.      Mouth/Throat:      Mouth: Mucous membranes are moist.   Eyes: General: No visual field deficit. Extraocular Movements: Extraocular movements intact. Conjunctiva/sclera: Conjunctivae normal.      Pupils: Pupils are equal, round, and reactive to light. Cardiovascular:      Rate and Rhythm: Normal rate and regular rhythm. Pulses: Normal pulses. Heart sounds: Normal heart sounds. No murmur heard. No gallop. Pulmonary:      Effort: Pulmonary effort is normal. No respiratory distress. Breath sounds: Normal breath sounds. No wheezing, rhonchi or rales. Chest:      Chest wall: No tenderness. Abdominal:      General: Abdomen is flat. Bowel sounds are normal.      Palpations: Abdomen is soft. Tenderness: There is no abdominal tenderness. There is no guarding or rebound. Skin:     General: Skin is warm and dry. Capillary Refill: Capillary refill takes less than 2 seconds. Neurological:      General: No focal deficit present. Mental Status: She is alert and oriented to person, place, and time. GCS: GCS eye subscore is 4. GCS verbal subscore is 5. GCS motor subscore is 6. Cranial Nerves: Cranial nerves are intact. No cranial nerve deficit, dysarthria or facial asymmetry. Sensory: Sensation is intact. No sensory deficit. Motor: Motor function is intact. No weakness, tremor, atrophy, abnormal muscle tone or pronator drift. Coordination: Coordination is intact.  Coordination normal. Finger-Nose-Finger Test and Heel to [facility] Test normal.   Psychiatric:         Mood and Affect: Mood normal.         RESULTS     EKG: All EKG's are interpreted by the Emergency Department Physician who either signs or Co-signsthis chart in the absence of a cardiologist.    EKG Interpretation    Interpreted by emergency department physician    Rhythm: normal sinus   Rate: normal  Axis: normal  Ectopy: none  Conduction: normal  ST Segments: nonspecific changes  T Waves: no acute change  Q Waves: none    Clinical Impression: no acute changes    Franc Berman MD      RADIOLOGY:   Non-plain filmimages such as CT, Ultrasound and MRI are read by the radiologist.     Interpretation per the Radiologist below, if available at the time ofthis note:    CT CHEST PULMONARY EMBOLISM W CONTRAST   Final Result   No evidence of central to segmental pulmonary emboli. No acute   pleuroparenchymal disease. CT Head WO Contrast   Final Result   No acute intracranial abnormality. XR CHEST PORTABLE   Final Result   No displaced rib fracture or pneumothorax appreciated on this projection. ED BEDSIDE ULTRASOUND:   Performed by ED Physician - none    LABS:  Labs Reviewed   CBC WITH AUTO DIFFERENTIAL - Abnormal; Notable for the following components:       Result Value    RBC 3.84 (*)     All other components within normal limits    Narrative:     Performed at:  [address] [facility]   Phone [phone]   BASIC METABOLIC PANEL W/ REFLEX TO MG FOR LOW K - Abnormal; Notable for the following components:    Sodium 131 (*)     Chloride 94 (*)     Glucose 133 (*)     All other components within normal limits    Narrative:     Performed at:  [address] [facility]   Phone [phone]   MAGNESIUM - Abnormal; Notable for the following components:    Magnesium 2.50 (*)     All other components within normal limits    Narrative:     Performed at:  [address] [facility]   Phone [phone]   TROPONIN    Narrative:     Performed at:  [address] [facility]   Phone [phone]       All other labs were within normal range or not returned as of this dictation.     EMERGENCY DEPARTMENT COURSE and DIFFERENTIAL DIAGNOSIS/MDM:   Vitals:    Vitals:    08/13/21 2115 08/13/21 2130 08/13/21 admission. No change in neurologic status. [SC]      ED Course User Index  [SC] Claudia Hamm MD         CRITICAL CARE TIME   Total Critical Care time was 15 minutes, excluding separately reportable procedures. There was a high probability of clinically significant/life threatening deterioration in the patient's condition which required my urgent intervention. CONSULTS:  IP CONSULT TO HOSPITALIST    PROCEDURES:  Unless otherwise noted below, none     Procedures    FINAL IMPRESSION      1. Syncope and collapse          DISPOSITION/PLAN   DISPOSITION        PATIENT REFERREDTO:  No follow-up provider specified.     DISCHARGEMEDICATIONS:  New Prescriptions    No medications on file          (Please note that portions of this note were completed with a voice recognition program.  Efforts were made to edit the dictations but occasionally words are mis-transcribed.)    Claudia Hamm MD (electronically signed)  Attending Emergency Physician         Claudia Hamm MD  08/13/21 0216

## 2021-08-14 NOTE — CONSULTS
History and Physical  [facility]   Cardiology    Chief Complaint:  syncope    HPI:     Patient is a 77 y.o. female presented by squad after passing out with family at a restaurant. The EMS report can not be found. She had no premonition to the episode. She became unconscious, very sweaty, and pale. Her ( emt) tried to find her pulse but could not( radial and carotid). She was soaking wet so difficult. He could tell she was breathing and thought it lasted about 7 minutes. She awakened about the time squad arrived. Bp taken but unknown. She felt washed out, but walked to EMS unit. Her bp was low for her in er. She has been on same dose of benicar for sometime. She has a hx of grand mal seizure and post ictal confusion per . She has no cardiac sx or hx otherwise. Past Medical History:   Diagnosis Date    Epilepsy ([facility] [address]     Hyperlipidemia     Hypertension     Migraines     Sleep apnea     cpap      Past Surgical History:   Procedure Laterality Date    NE COLONOSCOPY FLX DX W/COLLJ SPEC WHEN PFRMD N/A 11/28/2018    COLONOSCOPY DIAGNOSTIC OR SCREENING performed by Elvis Cerna MD at [address] [place] [address]  as a child        Medications Prior to Admission: atorvastatin (LIPITOR) 10 MG tablet, Take 10 mg by mouth  Cholecalciferol (VITAMIN D) 2000 units CAPS capsule, Take 1 capsule by mouth daily  CARBATROL 300 MG CR capsule, Take 1 capsule by mouth 2 times daily. Multiple Vitamin (MULTIVITAMIN PO), Take  by mouth. MAGNESIUM PO, Take 400 mg by mouth daily.     olmesartan (BENICAR) 20 MG tablet, Take 20 mg by mouth daily     Allergies   Allergen Reactions    Erythromycin       Social History     Tobacco Use    Smoking status: Never Smoker    Smokeless tobacco: Never Used   Substance Use Topics    Alcohol use: No      Family History   Problem Relation Age of Onset    Prostate Cancer Father     Heart Disease Father     Cancer Father     Heart Attack Mother 46    Heart Surgery Mother 64        CABG    Heart Disease Mother     Schizophrenia Brother     Hypertension Other     High Cholesterol Other     Heart Disease Other     Migraines Other     Parkinsonism Other     Colon Cancer Other         Review of Systems:  · Constitutional: No Fever or Weight Loss  · Eyes: No decreased vision  · ENT: No Headaches, Hearing Loss or Vertigo  · Cardiovascular:  loss of consciousness  · Respiratory: No cough or wheezing    · Gastrointestinal: No abdominal pain, appetite loss, blood in stools, constipation, diarrhea or heartburn  · Genitourinary: No dysuria, trouble voiding, or hematuria  · Musculoskeletal:  No gait disturbance, weakness or joint complaints  · Integumentary: No rash or pruritis  · Neurological: No TIA or stroke symptoms, has seizure hx followed by .   · Psychiatric: No anxiety or depression  · Endocrine: No malaise, fatigue or temperature intolerance  · Hematologic/Lymphatic: No bleeding problems, blood clots or swollen lymph nodes  · Allergic/Immunologic: No nasal congestion or hives      Objective Data:     /80   Pulse 69   Temp 98.1 °F (36.7 °C) (Oral)   Resp 18   Ht 5' 4\" (1.626 m)   Wt 133 lb 13.1 oz (60.7 kg)   SpO2 100%   BMI 22.97 kg/m²     General appearance: alert, appears stated age and cooperative  Alert, awake, oriented x 3  Eyes:  No erythema  Head: atraumatic  Neck:  no JVD  Lungs: clear to auscultation bilaterally  Heart: regular rate and rhythm, S1, S2 normal, no murmur, click, rub or gallop  Abdomen: soft, non-tender; bowel sounds normal; no masses,  no organomegaly  Extremities: extremities normal, atraumatic, no cyanosis or edema  Skin: Skin color, texture, turgor normal. No rashes or lesions  Hematologic: no remarkable bruising   scolisos    ECG: normal sinus rhythm     Monitor pvcs       Data Review    Recent Labs     08/13/21 2045   *   K 3.5   CL 94*   CO2 22   BUN 14   CREATININE 0.6     Recent Labs     08/13/21 2045 WBC 6.1   HGB 12.7   HCT 36.2   MCV 94.1        Lab Results   Component Value Date    TROPONINI <0.01 08/14/2021       Hyponatremia ? From carbamazepine  Assessment:     Active Problems:    Syncope and collapse  Resolved Problems:    * No resolved hospital problems. *      Plan:     1. Likely hypotensive syncope, but will await echo. 2. Dicussed pros and cans of monitoring when likely hypotension.

## 2021-08-15 ENCOUNTER — TELEPHONE (OUTPATIENT)
Dept: CARDIOLOGY | Age: 66
End: 2021-08-15

## 2021-08-15 VITALS
TEMPERATURE: 98.1 F | BODY MASS INDEX: 22.85 KG/M2 | HEART RATE: 77 BPM | WEIGHT: 133.82 LBS | HEIGHT: 64 IN | DIASTOLIC BLOOD PRESSURE: 83 MMHG | OXYGEN SATURATION: 99 % | SYSTOLIC BLOOD PRESSURE: 124 MMHG | RESPIRATION RATE: 17 BRPM

## 2021-08-15 DIAGNOSIS — R55 SYNCOPE, UNSPECIFIED SYNCOPE TYPE: Primary | ICD-10-CM

## 2021-08-15 PROCEDURE — 2580000003 HC RX 258: Performed by: NURSE PRACTITIONER

## 2021-08-15 PROCEDURE — 6360000002 HC RX W HCPCS: Performed by: NURSE PRACTITIONER

## 2021-08-15 PROCEDURE — G0378 HOSPITAL OBSERVATION PER HR: HCPCS

## 2021-08-15 PROCEDURE — 96372 THER/PROPH/DIAG INJ SC/IM: CPT

## 2021-08-15 PROCEDURE — 94760 N-INVAS EAR/PLS OXIMETRY 1: CPT

## 2021-08-15 PROCEDURE — 99214 OFFICE O/P EST MOD 30 MIN: CPT | Performed by: NURSE PRACTITIONER

## 2021-08-15 PROCEDURE — 6370000000 HC RX 637 (ALT 250 FOR IP): Performed by: NURSE PRACTITIONER

## 2021-08-15 RX ADMIN — ATORVASTATIN CALCIUM 10 MG: 10 TABLET, FILM COATED ORAL at 08:31

## 2021-08-15 RX ADMIN — LOSARTAN POTASSIUM 50 MG: 50 TABLET, FILM COATED ORAL at 08:31

## 2021-08-15 RX ADMIN — CARBAMAZEPINE 300 MG: 300 CAPSULE, EXTENDED RELEASE ORAL at 08:31

## 2021-08-15 RX ADMIN — Medication 10 ML: at 08:31

## 2021-08-15 RX ADMIN — ENOXAPARIN SODIUM 40 MG: 40 INJECTION SUBCUTANEOUS at 08:31

## 2021-08-15 ASSESSMENT — PAIN SCALES - GENERAL
PAINLEVEL_OUTOF10: 0
PAINLEVEL_OUTOF10: 0

## 2021-08-15 NOTE — DISCHARGE INSTR - DIET

## 2021-08-15 NOTE — TELEPHONE ENCOUNTER
Discharged from hospital 8/15. She needs 30 Day event monitor and will  in office on Monday.  She will need 6 week follow up with cardiologist.

## 2021-08-15 NOTE — DISCHARGE SUMMARY
Hospital Medicine Discharge Summary    Patient: Olivia Kay     Gender: female  : 1955   Age: 77 y.o. MRN: [id number]    Admitting Physician: Gudelia Gonzalez MD  Discharge Physician: Annamarie Covington MD     Code Status: Prior     Admit Date: 2021   Discharge Date:   8/15/21    Disposition:  Home    Discharge Diagnoses:  -Syncope and collapse likely neurocardiogenic  Echocardiogram non diagnostic.    -Seizure order. Episode described on presentation was not seizure. . Continue Tegretol     -Hyponatremia-131, likely chronic-patient to check Na levels as an OP          -Essential hypertension-stable-continue losartan     -Hyperlipidemia-continue statin      Follow-up appointments:  two weeks    Outpatient to do list: come to cardiology for a holter monitor tomorrow    Condition at Discharge:  Stable    Hospital Course: The patient is a 77 y.o. female with a h/o epilepsy- last seizure was in , HTN, EZIO on CPAP and migraines. Patient and her  were at [address] [employer] her birthday when she suddenly became unresponsive and dropped her fork. He said that her head went back eyes closed and she was breathing. He was not sure she had a pulse but she became severely diaphoretic. She came to and was alert and oriented to person and place and aware of her location. She had no prodromal symptoms such as lightheadedness dizziness chest pain or weakness. ED work-up showed no elevation of troponin, EKG unremarkable, CThead was unremarkable. Cardiology felt it was syncope and will place an event recorder on her in the office tomorrow 21.     Discharge Medications:   Discharge Medication List as of 8/15/2021  2:21 PM        Discharge Medication List as of 8/15/2021  2:21 PM        Discharge Medication List as of 8/15/2021  2:21 PM      CONTINUE these medications which have NOT CHANGED    Details   olmesartan (BENICAR) 20 MG tablet Take 20 mg by mouth daily Historical Med      atorvastatin (LIPITOR) 10 MG tablet Take 10 mg by mouthHistorical Med      Cholecalciferol (VITAMIN D) 2000 units CAPS capsule Take 1 capsule by mouth dailyHistorical Med      CARBATROL 300 MG CR capsule Take 1 capsule by mouth 2 times daily. , Disp-180 capsule, R-3, EVERETT      Multiple Vitamin (MULTIVITAMIN PO) Take  by mouth. MAGNESIUM PO Take 400 mg by mouth daily. Discharge Medication List as of 8/15/2021  2:21 PM      STOP taking these medications       losartan (COZAAR) 25 MG tablet Comments:   Reason for Stopping:               Discharge ROS:  A complete review of systems was asked and negative except for above     Discharge Exam:    /83   Pulse 77   Temp 98.1 °F (36.7 °C) (Oral)   Resp 17   Ht 5' 4\" (1.626 m)   Wt 133 lb 13.1 oz (60.7 kg)   SpO2 99%   BMI 22.97 kg/m²   General appearance:  NAD  HEENT:   Normal cephalic, atraumatic, moist mucous membranes, no oropharyngeal erythema or exudate  Neck: Supple, trachea midline, no anterior cervical or SC LAD  Heart[de-identified] Normal s1/s2, RRR, no murmurs, gallops, or rubs. no leg edema  Lungs:  CTA bilaterally. Abdomen: Soft, non-tender, non-distended, bowel sounds present, no masses  Musculoskeletal:  No clubbing, no cyanosis, no edema  Skin: No lesion or masses  Neurologic:  Neurovascularly intact without any focal sensory/motor deficits. Cranial nerves: II-XII intact, grossly non-focal.  Psychiatric:  A & O x3  Neuro: Grossly intact, moves all four extremities     Labs:  For convenience and continuity at follow-up the following most recent labs are provided:    Lab Results   Component Value Date    WBC 6.1 08/13/2021    HGB 12.7 08/13/2021    HCT 36.2 08/13/2021    MCV 94.1 08/13/2021     08/13/2021     08/13/2021    K 3.5 08/13/2021    CL 94 08/13/2021    CO2 22 08/13/2021    BUN 14 08/13/2021    CREATININE 0.6 08/13/2021    CALCIUM 9.0 08/13/2021    ALKPHOS 71 10/19/2012    ALT 19 10/19/2012    AST 22 10/19/2012    BILITOT 0.50 10/19/2012    LABALBU 4.7 10/19/2012    LDLCALC 141 10/19/2012    TRIG 75 10/19/2012     No results found for: INR    Radiology:  Echo Complete    Result Date: 8/15/2021  Transthoracic Echocardiography Report (TTE)  Demographics   Patient Name       Camelia MOORE   Date of Study      08/14/2021         Gender              Female   Patient Number     [id number]         Date of Birth       1955   Visit Number       [id number]          Age                 77 year(s)   Accession Number   [id number]         Room Number         1677   Corporate ID       [id number]            Katharines Diandra0Koby Steinamangerer [address] Physician Hermelinda Chandler,  Interpreting        [address] [place] [address].                     NP                 Physician           Lina Butler MD  Procedure Type of Study   TTE procedure:ECHOCARDIOGRAM COMPLETE 2D W DOPPLER W COLOR. Procedure Date Date: 08/14/2021 Start: 03:34 PM Study Location: [facility] - Echo Lab Technical Quality: Adequate visualization Indications:Syncope and Heart murmur. Additional Indications:HTN HLD . Patient Status: Routine Height: 64 inches Weight: 133 pounds BSA: 1.64 m2 BMI: 22.83 kg/m2 Rhythm: Within normal limits HR: 65 bpm BP: 126/80 mmHg  Conclusions   Summary  Left ventricular cavity size is normal.Normal left ventricular wall  thickness. Ejection fraction is visually estimated to be 50-55%. Regional wall motion abnormalities are not noted. Normal diastolic function. No evidence of aortic valve stenosis. Normal right ventricular size and function.    Signature   ------------------------------------------------------------------  Electronically signed by Lina Butler MD (Interpreting  physician) on 08/15/2021 at 11:20 AM  ------------------------------------------------------------------   Findings   Left Ventricle  Left ventricular cavity size is normal.  Normal left ventricular wall thickness. Ejection fraction is visually estimated to be 50-55%. Regional wall motion abnormalities are not noted. Normal diastolic function. Mitral Valve  Mild calcification of the leaflets of the mitral valve. Trivial mitral regurgitation. No evidence of mitral stenosis. Left Atrium  The left atrium is mildly dilated. Aortic Valve  The aortic valve is normal in structure and function. No evidence of aortic valve regurgitation. No evidence of aortic valve stenosis. Aorta  The aortic root is normal in size. The ascending aorta is normal in size. Right Ventricle  Normal right ventricular size and function. Right ventricular systolic function is normal.  TAPSE= 2.6cm   Tricuspid Valve  Trivial tricuspid regurgitation. No evidence of tricuspid stenosis. Right Atrium  The right atrium is mildly dilated. Pulmonic Valve  Trivial pulmonic regurgitation present. No evidence of pulmonic valve stenosis. Pericardial Effusion  No pericardial effusion noted. Pleural Effusion  No pleural effusion. Miscellaneous  IVC size is normal (<2.1cm) and collapses > 50% with respiration consistent  with normal RA pressure (3mmHg). Unable to estimate pulmonary artery pressure secondary to incomplete TR jet  envelope.   M-Mode/2D Measurements (cm)   LV Diastolic Dimension: 3.41 cm LV Systolic Dimension: 8.93 cm  LV Septum Diastolic: 3.33 cm  LV PW Diastolic: 4.85 cm        AO Root Dimension: 4.92 cm  RV Diastolic Dimension: 7.96 cm                                  LA Area: 18.3 cm2  LVOT: 2.03 cm                   LA volume/Index: 57 ml /35 ml/m2  Doppler Measurements   AV Peak Velocity: 112 cm/s     MV Peak E-Wave: 44.2 cm/s  AV Peak Gradient: 5.02 mmHg    MV Peak A-Wave: 48 cm/s  AV Mean Gradient: 3 mmHg       MV E/A Ratio: 0.92  LVOT Peak Velocity: 80.1 cm/s  MV P1/2t: 75 msec  AV Area (Continuity):2.44 cm2  MV Mean Gradient: 1 mmHg                                 MV Max P mmHg                                 MV Vmax:73.7 cm/s  Estimated RAP:3 mmHg           MV VTI:16.9 cm/s  E' Septal Velocity: 8.59 cm/s  E' Lateral Velocity: 11.2 cm/s MV Area (continuity): 3.01 cm2  PV Peak Velocity: 88.5 cm/s    MV Deceleration Time: 256 msec  PV Peak Gradient: 3.13 mmHg    MV Area (PHT): 2.93 cm2   Aortic Valve   Peak Velocity: 112 cm/s     Mean Velocity: 81 cm/s  Peak Gradient: 5.02 mmHg    Mean Gradient: 3 mmHg  Area (continuity): 2.44 cm2  AV VTI: 20.8 cm  Aorta   Aortic Root: 3.48 cm  Ascending Aorta: 3.33 cm  LVOT Diameter: 2.03 cm      CT Head WO Contrast    Result Date: 2021  EXAMINATION: CT OF THE HEAD WITHOUT CONTRAST  2021 9:45 pm TECHNIQUE: CT of the head was performed without the administration of intravenous contrast. Dose modulation, iterative reconstruction, and/or weight based adjustment of the mA/kV was utilized to reduce the radiation dose to as low as reasonably achievable. COMPARISON: None. HISTORY: ORDERING SYSTEM PROVIDED HISTORY: Syncope TECHNOLOGIST PROVIDED HISTORY: Reason for exam:->Syncope Has a \"code stroke\" or \"stroke alert\" been called? ->No Decision Support Exception - unselect if not a suspected or confirmed emergency medical condition->Emergency Medical Condition (MA) Reason for Exam: Syncope Acuity: Acute Type of Exam: Initial FINDINGS: BRAIN/VENTRICLES: There is no acute intracranial hemorrhage, mass effect or midline shift. No abnormal extra-axial fluid collection. The gray-white differentiation is maintained without evidence of an acute infarct. There is no evidence of hydrocephalus. There is intracranial atherosclerosis ORBITS: The visualized portion of the orbits demonstrate no acute abnormality. SINUSES: The visualized paranasal sinuses and mastoid air cells demonstrate no acute abnormality. SOFT TISSUES/SKULL:  No acute abnormality of the visualized skull or soft tissues.      No acute intracranial abnormality. XR CHEST PORTABLE    Result Date: 8/13/2021  EXAMINATION: ONE XRAY VIEW OF THE CHEST 8/13/2021 9:25 pm COMPARISON: None. HISTORY: ORDERING SYSTEM PROVIDED HISTORY: Syncope TECHNOLOGIST PROVIDED HISTORY: Reason for exam:->Syncope Reason for Exam: Syncope Acuity: Acute Type of Exam: Initial FINDINGS: Cardiac silhouette is borderline enlarged. Mediastinal contours are otherwise unremarkable given rotated projection. Lungs demonstrate no focal consolidation or pleural effusion. No pneumothorax appreciated on this projection. Right upper lung granuloma. Dextroscoliosis of the thoracic spine. Levoscoliosis of the thoracolumbar junction. No displaced rib fracture appreciated on this projection. No displaced rib fracture or pneumothorax appreciated on this projection. CT CHEST PULMONARY EMBOLISM W CONTRAST    Result Date: 8/13/2021  EXAMINATION: CTA OF THE CHEST 8/13/2021 9:46 pm TECHNIQUE: CTA of the chest was performed after the administration of intravenous contrast.  Multiplanar reformatted images are provided for review. MIP images are provided for review. Dose modulation, iterative reconstruction, and/or weight based adjustment of the mA/kV was utilized to reduce the radiation dose to as low as reasonably achievable. COMPARISON: Chest x-ray 08/13/2021 HISTORY: ORDERING SYSTEM PROVIDED HISTORY: syncope TECHNOLOGIST PROVIDED HISTORY: Reason for exam:->syncope FINDINGS: Pulmonary Arteries: No evidence of central, lobar or segmental pulmonary emboli. The main pulmonary artery is unremarkable in caliber Mediastinum: The heart is enlarged. Trace pericardial fluid. No suspicious adenopathy identified. Lungs/pleura: Hypoventilatory changes. Otherwise no pleural effusion, pneumothorax. No significant. Upper Abdomen: Limited images of the upper abdomen are unremarkable. Soft Tissues/Bones: Mild change.   Dextrocurvature of the thoracic spine minimal superior plate depression at T4-T5 appear chronic. No evidence of central to segmental pulmonary emboli. No acute pleuroparenchymal disease. EKG     Rhythm: normal sinus   Rate: normal  Clinical Impression: no acute changes        The patient was seen and examined on day of discharge and this discharge summary is in conjunction with any daily progress note from day of discharge. Time Spent on discharge is 30 minutes  in the examination, evaluation, counseling and review of medications and discharge plan. Note that more than 30 minutes was spent in preparing discharge papers, discussing discharge with patient, medication review, etc.       Signed:    Frank Martinez MD   8/15/2021      Thank you Nate Forte for the opportunity to be involved in this patient's care.  If you have any questions or concerns please feel free to contact me at [facility]

## 2021-08-15 NOTE — PROGRESS NOTES
Reviewed discharge and follow up information with patient, patient verbalized understanding. Patient aware to follow up tomorrow for cardiac monitor outpatient. Patient received her home medications at discharge. Patient stable, ambulatory, and without s/s of distress at discharge.

## 2021-08-15 NOTE — PLAN OF CARE
Problem: Safety:  Goal: Free from accidental physical injury  Description: Free from accidental physical injury  8/15/2021 1034 by Jonathan Jean RN  Outcome: Ongoing  8/14/2021 2338 by Natividad Alvarez RN  Outcome: Ongoing  Goal: Free from intentional harm  Description: Free from intentional harm  8/15/2021 1034 by Jonathan Jean RN  Outcome: Ongoing  8/14/2021 2338 by Natividad Alvarez RN  Outcome: Ongoing

## 2021-08-15 NOTE — CARE COORDINATION
DC order rec'd. DC'd prior to being seen.   [facility]     Case Management   [phone]    8/15/2021  2:40 PM

## 2021-08-15 NOTE — PROGRESS NOTES
Patient alert and oriented x4, VSS, sitting up in chair eating breakfast.  Patient denies n/v, diarrhea, SOB, pain. Patient states she \"feels fine\" and is having no dizziness or lightheadedness. Patient is UAL and tolerating well. Non-slip socks on. Chair in locked position, call light within reach. Will continue to monitor pt needs.

## 2021-08-15 NOTE — PLAN OF CARE
Problem: Safety:  Goal: Free from accidental physical injury  Description: Free from accidental physical injury  8/15/2021 1412 by Cleveland Garsia RN  Outcome: Completed  8/15/2021 1034 by Cleveland Garsia RN  Outcome: Ongoing  Goal: Free from intentional harm  Description: Free from intentional harm  8/15/2021 1412 by Cleveland Garsia RN  Outcome: Completed  8/15/2021 1034 by Cleveland Garsia RN  Outcome: Ongoing

## 2021-08-15 NOTE — PROGRESS NOTES
[facility]   Daily Progress Note      Admit Date:  8/13/2021    CC: syncope    HPI:   Chandra Page is a 77 y.o. 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 Admitted to [address] [facility] after syncopal episode while out for dinner at a restaurant. Per her 's report, she was sitting in a chair eating and suddenly lost consciousness. She maintained respirations but he was unable to find a pulse. 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 Associated with significant diaphoresis. She was unconscious for about 7 min per family report and gradually woke up. EMS responded. SBP upon arrival was 100. Patient denied any CP, SOB, LH, palpitations or other symptoms prior to event.  reports that she did not appear to be having a seizure. Since admission, she has not had any further episodes of syncope, dizziness, or feeling lightheaded. She is ambulating in the halls without any issues. Tele is NSR with frequent PVCs. Review of Systems:   General: Denies fever, chills, fatigue, weakness  Skin: Denies skin changes, rash, itching, lesions.   HEENT: Denies headache, dizziness, vision changes, nosebleeds, sore throat, nasal drainage  RESP: Denies cough, sputum, dyspnea, wheeze, snoring  CARD: Denies palpitations,  murmur  GI:Denies nausea, vomiting, heartburn, loss of appetite, change in bowels  : Denies frequency, pain, incontinence, polyuria  VASC: Denies claudication, leg cramps, clots  MUSC/SKEL: Denies pain, stiffness, arthritis  PSYCH: Denies anxiety, depression, stress  NEURO: Denies numbness, tingling, weakness,change in mood or memory  HEME: Denies abn bruising, bleeding, anemia  ENDO: Denies intolerance to heat, cold, excessive thirst or hunger, hx thyroid disease    Objective:   /83   Pulse 77   Temp 98.1 °F (36.7 °C) (Oral)   Resp 17   Ht 5' 4\" (1.626 m)   Wt 133 lb 13.1 oz (60.7 kg)   SpO2 100%   BMI 22.97 kg/m²         Intake/Output Summary (Last 24 hours) at 8/15/2021 [address] [facility] filed at 8/14/2021 1952  Gross per 24 hour   Intake 645.29 ml   Output --   Net 645.29 ml       WEIGHT:Admit Weight: 133 lb 6.1 oz (60.5 kg)         Today  Weight: 133 lb 13.1 oz (60.7 kg)   DRY WEIGHT:  Wt Readings from Last 3 Encounters:   08/15/21 133 lb 13.1 oz (60.7 kg)   11/28/18 130 lb (59 kg)   10/12/17 132 lb (59.9 kg)       Physical Exam:  GEN: Appears well, no acute distress  SKIN: Pink, warm, dry. Nails without clubbing. HEENT: PERRLA. Normocephalic, atraumatic. Neck supple. No adenopathy. LUNG: AP diameter normal. Clear bilateral. No wheeze, rales, or ronchi. Respiratory effort normal.  HEART: S1S2 A/R. No JVD. No carotid bruit. + diastolic murmur LSB 2ICS, No rub or gallop. ABD: Soft, nontender. +BS X 4 quads. No hepatomegaly. EXT: Radial and pedal pulses 2+ and symmetric. Without varicosities. No edema. MUSCSKEL: Good ROM X4 extremities. No deformity. NEURO: A/O X3. Calm and cooperative. Telemetry: NSR PVCs    Medications:    atorvastatin  10 mg Oral Daily    losartan  50 mg Oral Daily    carBAMazepine  300 mg Oral BID    sodium chloride flush  5-40 mL Intravenous 2 times per day    enoxaparin  40 mg Subcutaneous Daily      sodium chloride       sodium chloride flush, sodium chloride, ondansetron **OR** ondansetron, polyethylene glycol, acetaminophen **OR** acetaminophen, perflutren lipid microspheres    Lab Data: I have reviewed all labs below today.    CBC:   Recent Labs     08/13/21 2045   WBC 6.1   HGB 12.7   HCT 36.2   MCV 94.1        BMP:   Recent Labs     08/13/21 2045   *   K 3.5   CL 94*   CO2 22   BUN 14   CREATININE 0.6     GLUCOSE:   Recent Labs     08/13/21 2045   GLUCOSE 133*     LIVER PROFILE:   Lab Results   Component Value Date    AST 22 10/19/2012    ALT 19 10/19/2012    LABALBU 4.7 10/19/2012    BILITOT 0.50 10/19/2012    ALKPHOS 71 10/19/2012     PT/INR: No results found for: PROTIME, INR  APTT: No results found for: APTT  Pro-BNP: No results found for: PROBNP  Parameters:   > 450 pg/mL under age 48  > 900 pg/mL ages 54-65  > 1800 pg/mL over age 76    ENZYMES:  Lab Results   Component Value Date    TROPONINI <0.01 08/14/2021     FASTING LIPID PANEL:  Lab Results   Component Value Date    CHOL 262 10/19/2012     10/19/2012    LDLCALC 141 10/19/2012    TRIG 75 10/19/2012       Diagnostics:    EKG: Normal sinus rhythm  Poor data quality, interpretation may be adversely affected  Nonspecific T wave abnormality vs artifact  Borderline ECG  No previous ECGs available  Confirmed by [facility] Mad[facility] MESSER ([id number]) on 8/14/2021 10:24:24 PM    ECHO:   Summary   Left ventricular cavity size is normal.Normal left ventricular wall   thickness. Ejection fraction is visually estimated to be 50-55%. Regional wall motion abnormalities are not noted. Normal diastolic function. No evidence of aortic valve stenosis. Normal right ventricular size and function. Assessment/Plan:    1.) Syncope: Unclear etiology. No further syncope or near syncope. -No recorder low BP per EMS or during hospitalization  -No evidence of arrhythmias on tele other than PVCs  -ECHO normal - no AS  -OK to discharge today. Patient agrees to wear 30 Day event monitor and will  in [facility] office Monday morning. -FU in 6 weeks    2.) Diastolic murmur: No significant valve issues or structure abnormalities on ECHO. Patient is OK for discharge from cardiology standpoint.      Electronically signed by PACO Montoya CNP on 8/15/2021 at 8:33 AM

## 2021-08-15 NOTE — PLAN OF CARE
Problem: Safety:  Goal: Free from accidental physical injury  Description: Free from accidental physical injury  8/14/2021 2338 by Mary Ellen Mar RN  Outcome: Ongoing     Problem: Safety:  Goal: Free from intentional harm  Description: Free from intentional harm  8/14/2021 2338 by Mary Ellen Mar RN  Outcome: Ongoing     Problem: Discharge Planning:  Goal: Patients continuum of care needs are met  Description: Patients continuum of care needs are met  8/14/2021 2338 by Mary Ellen Mar RN  Outcome: Ongoing

## 2021-08-16 PROCEDURE — 93228 REMOTE 30 DAY ECG REV/REPORT: CPT | Performed by: INTERNAL MEDICINE

## 2021-09-16 ENCOUNTER — TELEPHONE (OUTPATIENT)
Dept: CARDIOLOGY CLINIC | Age: 66
End: 2021-09-16

## 2021-09-16 NOTE — TELEPHONE ENCOUNTER
Left message for patient to return the call to the office. Patient seen in the hospital following a syncopal episode and completed a 30 day event monitor. Monitor results reviewed by Dr. Petrona Jeronimo showing PACs and PVCs with no arrhythmias to account for syncope. Will discuss results and the need for follow up when she returns the call.

## 2021-09-16 NOTE — TELEPHONE ENCOUNTER
Spoke with Moe Rosario regarding results and she v/u. Agreeable to cancel appointment and instructed her to call with any recurrence of symptoms.

## 2021-09-20 DIAGNOSIS — R55 SYNCOPE, UNSPECIFIED SYNCOPE TYPE: ICD-10-CM

## 2022-05-18 PROBLEM — M85.89 OSTEOPENIA OF MULTIPLE SITES: Status: ACTIVE | Noted: 2022-05-18

## 2022-05-18 PROBLEM — I10 HYPERTENSION: Status: ACTIVE | Noted: 2022-05-18

## 2022-06-15 PROBLEM — E22.2 SIADH (SYNDROME OF INAPPROPRIATE ADH PRODUCTION) (HCC): Status: ACTIVE | Noted: 2022-06-15

## 2022-10-18 PROBLEM — M25.552 HIP PAIN, LEFT: Status: ACTIVE | Noted: 2022-10-18

## 2022-10-20 ENCOUNTER — HOSPITAL ENCOUNTER (OUTPATIENT)
Dept: GENERAL RADIOLOGY | Age: 67
Discharge: HOME | End: 2022-10-20
Payer: MEDICARE

## 2022-10-20 ENCOUNTER — HOSPITAL ENCOUNTER (OUTPATIENT)
Age: 67
Discharge: HOME | End: 2022-10-20
Payer: MEDICARE

## 2022-10-20 DIAGNOSIS — M25.552 HIP PAIN, LEFT: ICD-10-CM

## 2022-10-20 PROCEDURE — 72100 X-RAY EXAM L-S SPINE 2/3 VWS: CPT

## 2022-10-20 PROCEDURE — 73521 X-RAY EXAM HIPS BI 2 VIEWS: CPT

## 2022-12-14 ENCOUNTER — HOSPITAL ENCOUNTER (EMERGENCY)
Age: 67
Discharge: HOME | End: 2022-12-14
Attending: EMERGENCY MEDICINE
Payer: MEDICARE

## 2022-12-14 ENCOUNTER — APPOINTMENT (OUTPATIENT)
Dept: GENERAL RADIOLOGY | Age: 67
End: 2022-12-14
Payer: MEDICARE

## 2022-12-14 VITALS
TEMPERATURE: 97.6 F | WEIGHT: 125 LBS | HEART RATE: 70 BPM | HEIGHT: 63 IN | RESPIRATION RATE: 16 BRPM | SYSTOLIC BLOOD PRESSURE: 121 MMHG | OXYGEN SATURATION: 100 % | DIASTOLIC BLOOD PRESSURE: 70 MMHG | BODY MASS INDEX: 22.15 KG/M2

## 2022-12-14 DIAGNOSIS — R55 SYNCOPE AND COLLAPSE: Primary | ICD-10-CM

## 2022-12-14 DIAGNOSIS — E87.1 HYPONATREMIA: ICD-10-CM

## 2022-12-14 LAB
A/G RATIO: 1.8 (ref 1.1–2.2)
ALBUMIN SERPL-MCNC: 4.2 G/DL (ref 3.4–5)
ALP BLD-CCNC: 98 U/L (ref 40–129)
ALT SERPL-CCNC: 11 U/L (ref 10–40)
ANION GAP SERPL CALCULATED.3IONS-SCNC: 10 MMOL/L (ref 3–16)
ANION GAP SERPL CALCULATED.3IONS-SCNC: 9 MMOL/L (ref 3–16)
AST SERPL-CCNC: 16 U/L (ref 15–37)
BACTERIA: NORMAL /HPF
BASOPHILS ABSOLUTE: 0 K/UL (ref 0–0.2)
BASOPHILS RELATIVE PERCENT: 0.4 %
BILIRUB SERPL-MCNC: <0.2 MG/DL (ref 0–1)
BILIRUBIN URINE: NEGATIVE
BLOOD, URINE: NEGATIVE
BUN BLDV-MCNC: 14 MG/DL (ref 7–20)
BUN BLDV-MCNC: 18 MG/DL (ref 7–20)
CALCIUM SERPL-MCNC: 8.9 MG/DL (ref 8.3–10.6)
CALCIUM SERPL-MCNC: 9.2 MG/DL (ref 8.3–10.6)
CHLORIDE BLD-SCNC: 88 MMOL/L (ref 99–110)
CHLORIDE BLD-SCNC: 93 MMOL/L (ref 99–110)
CLARITY: CLEAR
CO2: 24 MMOL/L (ref 21–32)
CO2: 27 MMOL/L (ref 21–32)
COLOR: YELLOW
CREAT SERPL-MCNC: <0.5 MG/DL (ref 0.6–1.2)
CREAT SERPL-MCNC: <0.5 MG/DL (ref 0.6–1.2)
EOSINOPHILS ABSOLUTE: 0 K/UL (ref 0–0.6)
EOSINOPHILS RELATIVE PERCENT: 0.9 %
EPITHELIAL CELLS, UA: 1 /HPF (ref 0–5)
GFR SERPL CREATININE-BSD FRML MDRD: >60 ML/MIN/{1.73_M2}
GFR SERPL CREATININE-BSD FRML MDRD: >60 ML/MIN/{1.73_M2}
GLUCOSE BLD-MCNC: 117 MG/DL (ref 70–99)
GLUCOSE BLD-MCNC: 134 MG/DL
GLUCOSE BLD-MCNC: 134 MG/DL (ref 70–99)
GLUCOSE BLD-MCNC: 141 MG/DL (ref 70–99)
GLUCOSE URINE: NEGATIVE MG/DL
HCT VFR BLD CALC: 31.9 % (ref 36–48)
HEMOGLOBIN: 10.9 G/DL (ref 12–16)
HYALINE CASTS: 4 /LPF (ref 0–8)
KETONES, URINE: ABNORMAL MG/DL
LEUKOCYTE ESTERASE, URINE: NEGATIVE
LYMPHOCYTES ABSOLUTE: 1.1 K/UL (ref 1–5.1)
LYMPHOCYTES RELATIVE PERCENT: 21.4 %
MCH RBC QN AUTO: 32 PG (ref 26–34)
MCHC RBC AUTO-ENTMCNC: 34.2 G/DL (ref 31–36)
MCV RBC AUTO: 93.5 FL (ref 80–100)
MICROSCOPIC EXAMINATION: YES
MONOCYTES ABSOLUTE: 0.5 K/UL (ref 0–1.3)
MONOCYTES RELATIVE PERCENT: 9.8 %
NEUTROPHILS ABSOLUTE: 3.5 K/UL (ref 1.7–7.7)
NEUTROPHILS RELATIVE PERCENT: 67.5 %
NITRITE, URINE: NEGATIVE
PDW BLD-RTO: 12.3 % (ref 12.4–15.4)
PERFORMED ON: ABNORMAL
PH UA: 7.5 (ref 5–8)
PLATELET # BLD: 232 K/UL (ref 135–450)
PMV BLD AUTO: 7.2 FL (ref 5–10.5)
POTASSIUM SERPL-SCNC: 4.1 MMOL/L (ref 3.5–5.1)
POTASSIUM SERPL-SCNC: 4.4 MMOL/L (ref 3.5–5.1)
PROTEIN UA: 30 MG/DL
RBC # BLD: 3.41 M/UL (ref 4–5.2)
RBC UA: 2 /HPF (ref 0–4)
SODIUM BLD-SCNC: 124 MMOL/L (ref 136–145)
SODIUM BLD-SCNC: 127 MMOL/L (ref 136–145)
SPECIFIC GRAVITY UA: 1.02 (ref 1–1.03)
TOTAL PROTEIN: 6.5 G/DL (ref 6.4–8.2)
TROPONIN: <0.01 NG/ML
URINE REFLEX TO CULTURE: ABNORMAL
URINE TYPE: ABNORMAL
UROBILINOGEN, URINE: 0.2 E.U./DL
WBC # BLD: 5.2 K/UL (ref 4–11)
WBC UA: 3 /HPF (ref 0–5)

## 2022-12-14 PROCEDURE — 6370000000 HC RX 637 (ALT 250 FOR IP): Performed by: NURSE PRACTITIONER

## 2022-12-14 PROCEDURE — 96361 HYDRATE IV INFUSION ADD-ON: CPT

## 2022-12-14 PROCEDURE — 84484 ASSAY OF TROPONIN QUANT: CPT

## 2022-12-14 PROCEDURE — 93005 ELECTROCARDIOGRAM TRACING: CPT | Performed by: NURSE PRACTITIONER

## 2022-12-14 PROCEDURE — 81001 URINALYSIS AUTO W/SCOPE: CPT

## 2022-12-14 PROCEDURE — 71046 X-RAY EXAM CHEST 2 VIEWS: CPT

## 2022-12-14 PROCEDURE — 80053 COMPREHEN METABOLIC PANEL: CPT

## 2022-12-14 PROCEDURE — 2580000003 HC RX 258: Performed by: NURSE PRACTITIONER

## 2022-12-14 PROCEDURE — 99285 EMERGENCY DEPT VISIT HI MDM: CPT

## 2022-12-14 PROCEDURE — 96360 HYDRATION IV INFUSION INIT: CPT

## 2022-12-14 PROCEDURE — 85025 COMPLETE CBC W/AUTO DIFF WBC: CPT

## 2022-12-14 PROCEDURE — 36415 COLL VENOUS BLD VENIPUNCTURE: CPT

## 2022-12-14 RX ORDER — ONDANSETRON 4 MG/1
4 TABLET, ORALLY DISINTEGRATING ORAL ONCE
Status: COMPLETED | OUTPATIENT
Start: 2022-12-14 | End: 2022-12-14

## 2022-12-14 RX ORDER — 0.9 % SODIUM CHLORIDE 0.9 %
1000 INTRAVENOUS SOLUTION INTRAVENOUS ONCE
Status: COMPLETED | OUTPATIENT
Start: 2022-12-14 | End: 2022-12-14

## 2022-12-14 RX ADMIN — ONDANSETRON 4 MG: 4 TABLET, ORALLY DISINTEGRATING ORAL at 20:08

## 2022-12-14 RX ADMIN — SODIUM CHLORIDE 1000 ML: 9 INJECTION, SOLUTION INTRAVENOUS at 16:25

## 2022-12-14 ASSESSMENT — PAIN - FUNCTIONAL ASSESSMENT
PAIN_FUNCTIONAL_ASSESSMENT: NONE - DENIES PAIN
PAIN_FUNCTIONAL_ASSESSMENT: NONE - DENIES PAIN

## 2022-12-14 NOTE — ED TRIAGE NOTES
Pt in by squad from home. States donated blood for the first time this morning. Went to [facility] and had syncopal episode. Was out for 30 sec.  74/52 BP for squad

## 2022-12-14 NOTE — ED PROVIDER NOTES
[address]  [address] [address]  Dept: [phone]  Loc: [address] [place] [address] COMPLAINT    Chief Complaint   Patient presents with    Loss of Consciousness     Pt in by squad from home. States donated blood for the first time this morning. Went to [facility] and had syncopal episode. Was out for 30 sec. 74/52 BP for squad        HPI    Santy Cody is a 79 y.o. female who presents to the emergency department via EMS and her . Patient woke up this morning feeling fine. She went about her day and donated blood. Sometime after that she felt lightheaded. She went home and had something to eat. She then went on to her hair appointment and while she was sitting in the chair she felt lightheaded again. She put her head between her knees and she drink water and had a cookie. She thought everything was okay but she states that her hairdresser told her she was unresponsive for about 30 seconds. EMS was called and they checked her blood pressure and it was 74/52. On arrival the patient is sitting up and talkative. She denies feeling lightheaded, dizzy, headache, vision changes, shortness of breath, chest pain, abdominal pain, nausea or vomiting. She states that she is feeling much better. On arrival her blood pressure is 103/70. She denies being incontinent of bowel or bladder. Patient also reports that she has a history of SIADH and she is supposed to only take in 48 ounces of fluid a day however in preparation for her blood donation she drank much more than that. She also takes Carbatrol which lowers her sodium.     REVIEW OF SYSTEMS    Cardiac: no palpitations, no chest pain  Respiratory: no SOB, no new cough  Neurologic: see HPI, no headache, no double vision  GI: no vomiting, no diarrhea  Hematologic: no hematochezia, no hemoptysis  General: no fever, no chills  Musculoskeletal: see HPI  All other systems reviewed and are negative. Vasquezabimael Boswell PAST MEDICAL & SURGICAL HISTORY    Past Medical History:   Diagnosis Date    COVID 08/31/2022    Epilepsy ([facility] [address] 1989    generalized seizure d/o Dr Zuly Jimenez [facility]    Hyperlipidemia     Hypertension     Migraines     stopped after menopause age 48    Osteopenia of multiple sites     on nothing    SIADH (syndrome of inappropriate ADH production) ([facility] [address]     Sleep apnea     cpap    Vasovagal syncope 08/2021    admitted to [facility] and soto, was negative wasnt orthostatic at the time     Past Surgical History:   Procedure Laterality Date    KS COLONOSCOPY FLX DX W/COLLJ SPEC WHEN PFRMD N/A 11/28/2018    COLONOSCOPY DIAGNOSTIC OR SCREENING performed by Rashmi Jimenez MD at [address]  as a child       CURRENT MEDICATIONS  (may include discharge medications prescribed in the ED)  Current Outpatient Rx   Medication Sig Dispense Refill    olmesartan (BENICAR) 5 MG tablet TAKE 1 TABLET BY MOUTH IN THE MORNING 90 tablet 1    atorvastatin (LIPITOR) 10 MG tablet Take 10 mg by mouth      Cholecalciferol (VITAMIN D) 2000 units CAPS capsule Take 1 capsule by mouth daily (Patient not taking: Reported on 10/19/2022)      CARBATROL 300 MG CR capsule Take 1 capsule by mouth 2 times daily. 180 capsule 3    Multiple Vitamin (MULTIVITAMIN PO) Take  by mouth. MAGNESIUM PO Take 400 mg by mouth daily.    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: second marriage 3 step children   Tobacco Use    Smoking status: Never    Smokeless tobacco: Never    Tobacco comments:     father smoked a pipe Substance and Sexual Activity    Alcohol use: No    Drug use: No     Social Determinants of Health     Financial Resource Strain: Low Risk     Difficulty of Paying Living Expenses: Not hard at all   Food Insecurity: No Food Insecurity    Worried About Running Out of Food in the Last Year: Never true    Ran Out of Food in the Last Year: Never true     Family History   Problem Relation Age of Onset    Other Mother         alzheimers    Heart Attack Mother 46        ami    Heart Surgery Mother 64        CABG    Heart Disease Mother     Parkinsonism Father     Heart Surgery Father         cabg    Prostate Cancer Father     Heart Disease Father     Cancer Father         prostate    Schizophrenia Brother     Hypertension Other     High Cholesterol Other     Migraines Other     Colon Cancer Other        PHYSICAL EXAM    VITAL SIGNS: /65   Pulse 70   Temp 97.6 °F (36.4 °C) (Oral)   Resp 16   Ht 5' 3\" (1.6 m)   Wt 125 lb (56.7 kg)   SpO2 100%   BMI 22.14 kg/m²    Constitutional:  Well developed, well nourished, no acute distress  Eyes: Pupils equally round and reactive to light, sclera nonicteric  HENT:  Atraumatic, see integument, moist mucus membranes  Neck: supple, no JVD, no posterior neck tenderness  Respiratory:  Lungs clear to auscultation bilaterally, no retractions   Cardiovascular: Regular rhythm and rate, S1-S2.   No murmur  GI:  Soft, nontender, nondistended without rebound or guarding normal bowel sounds  Musculoskeletal:  No edema, no acute deformities  Integument:  Skin warm and dry, no petechiae  Neurologic: Awake, alert, oriented x3, no aphasia, no slurred speech, CN II-XII intact, normal finger to nose test bilaterally, 5/5 strength in all 4 extremities, sensation to light touch intact bilaterally, patellar reflexes 2+ and equal bilaterally  Vascular: Radial and DP pulses 2+ and equal bilaterally  Psychiatric: Cooperative, pleasant affect     EKG     Ventricular rate 65 bpm, NC interval 170 ms, QRS duration 94 ms, QTc 413 ms  No ST elevation or depression. T wave inversion in lead III. Interpretation is a normal sinus rhythm. Today's tracing compared to August 13, 2021 with no significant changes noted    RADIOLOGY  XR CHEST (2 VW)   Final Result   No acute cardiopulmonary disease. Labs Reviewed   CBC WITH AUTO DIFFERENTIAL - Abnormal; Notable for the following components:       Result Value    RBC 3.41 (*)     Hemoglobin 10.9 (*)     Hematocrit 31.9 (*)     RDW 12.3 (*)     All other components within normal limits   COMPREHENSIVE METABOLIC PANEL - Abnormal; Notable for the following components:    Sodium 124 (*)     Chloride 88 (*)     Glucose 141 (*)     Creatinine <0.5 (*)     All other components within normal limits   URINALYSIS WITH REFLEX TO CULTURE - Abnormal; Notable for the following components:    Ketones, Urine TRACE (*)     Protein, UA 30 (*)     All other components within normal limits   BASIC METABOLIC PANEL - Abnormal; Notable for the following components:    Sodium 127 (*)     Chloride 93 (*)     Glucose 117 (*)     Creatinine <0.5 (*)     All other components within normal limits   POCT GLUCOSE - Abnormal; Notable for the following components:    POC Glucose 134 (*)     All other components within normal limits   POCT GLUCOSE - Normal   TROPONIN   MICROSCOPIC URINALYSIS         ED COURSE & MEDICAL DECISION MAKING    Pertinent Labs & Imaging studies reviewed and interpreted. (See chart for details)  See chart for details of medications given during the ED stay.     Vitals:    12/14/22 1531 12/14/22 1545   BP: 103/70 119/65   Pulse: 63 70   Resp: 16 16   Temp: 97.6 °F (36.4 °C)    TempSrc: Oral    SpO2: 100% 100%   Weight: 125 lb (56.7 kg)    Height: 5' 3\" (1.6 m)      Medications   0.9 % sodium chloride bolus (0 mLs IntraVENous Stopped 12/14/22 1931)   ondansetron (ZOFRAN-ODT) disintegrating tablet 4 mg (4 mg Oral Given 12/14/22 2008)     This patient was seen and evaluated by myself and my attending physician Dr. Teena Bustillos    Differential diagnosis includes but is not limited to cardiac arrhythmia, dehydration, vasovagal syncope, other    She is nontoxic in appearance and hemodynamically stable. She has a history of SIADH. She supposed to have a limit of 48 ounces of fluid every 24 hours however she was planning on donating blood today and she did not think about the amount of fluids she was taking and before hand. She donated blood. After her donation she felt lightheaded. She drink water and he had a sandwich. She went on about her errands and went to her hair appointment. As she was sitting in the chair she felt lightheaded again. She drank another glass of water and had a cookie. The hairdresser reports that she had a 30 second episode of unresponsiveness. She did not fall or chair have any injuries EMS arrived on scene and reported a low blood pressure in the 70s. On arrival she sitting up. She is talkative. She is afebrile. Her arrival blood pressure is 103/70. She was given a liter of normal saline. She has a white count of 5.2 with an H&H of 10.9 and 31.9    She has a sodium of 124, potassium 4.1, chloride 88, CO2 27, BUN 18 with creatinine less than 0.5    Serum glucose 141    Troponin less than 0.01    No acute changes on EKG    Chest x-ray shows no acute cardiopulmonary process    Blood pressure is 119/65.    1835 spoke with Dr. Bronwyn Burnham regarding this patient. We will repeat her renal panel and I will call him back    1944 repeat sodium is 127 with a potassium of 4.4, chloride 93, CO2 24, BUN 14 and a creatinine less than 0.5  Serum glucose 117    1945 page out to Dr. Bronwyn Burnham. The patient wants to go home. Per Dr. Bronwyn Burnham she will be on a fluid restriction. She should follow-up with Dr. Fitz Givens in the next day or 2 and have her sodium redrawn. I will give her an outpatient prescription for a renal panel.   She can come to [facility] outpatient laboratory testing to get this done. She will limit the amount of fluids she takes in. She feels much better. She is ambulating in the department with a steady gait. I instructed the patient to follow up as an outpatient in 1 day. I instructed the patient to return to the ED immediately for any new or worsening symptoms. The patient verbalizes understanding. CRITICAL CARE NOTE:  There was a high probability of clinically significant life-threatening deterioration of the patient's condition requiring my urgent intervention. I personally saw the patient and independently provided 15 minutes of non-concurrent critical care out of the total shared critical care time provided. This includes multiple reevaluations, vital sign monitoring, pulse oximetry monitoring, telemetry monitoring, clinical response to the IV medications, reviewing the nursing notes, consultation time, dictation/documentation time, and interpretation of the labwork. (This time excludes time spent performing procedures). FINAL IMPRESSION    1. Syncope and collapse    2.  Hyponatremia        PLAN  Discharge with strict PCP follow-up      (Please note that this note was completed with a voice recognition program.  Every attempt was made to edit the dictations, but inevitably there remain words that are mis-transcribed.)        PACO Matute CNP  12/14/22 2017

## 2022-12-14 NOTE — ED PROVIDER NOTES
ED Attending Attestation Note    This patient was seen by the advanced practice provider. I personally saw the patient and performed a substantive portion of the visit including all aspects of the medical decision making. Briefly, 79 y.o. female who  has a past medical history of COVID, Epilepsy ([facility] [address] Hyperlipidemia, Hypertension, Migraines, Osteopenia of multiple sites, SIADH (syndrome of inappropriate ADH production) ([facility] [address] Sleep apnea, and Vasovagal syncope. presents with squad from home for evaluation of a syncopal episode that happened after she gave blood today. Patient was at her [facility] when she had a syncopal episode. Lasted approximately 30 seconds. EMS picked up the patient and she had a low blood pressure at 74/52. Patient blood pressure is remained stable here in the ED. Godwin Drummond Focused exam:   Gen: 79 y.o. female, NAD  HEENT: NCAT. PERRL. EOMI. CV: RRR w/o MRG  Lungs: CTAB. No incr WOB. Abdomen: Soft, nontender, nondistended. No rebound/guarding. Neuro: GCS 15, cranial nerves II through XII intact, 5/5 strength throughout, light touch sensation grossly intact, no dysarthria, no aphasia, no facial droop  Skin: Warm and dry    EKG interpreted by myself. Rate: normal  Rhythm: NSR  Axis: normal  Intervals: within normal limits  ST Segments: no acute abnormality  T waves: no acute abnormality  Comparison: Compared to 8/13/2021, there is no significant change  Impression: NSR with no acute abnormality     MDM:   Patient seen and evaluated. History and physical above. Nontoxic and afebrile. Patient presents for syncopal episode after giving blood today. Patient was initially hypotensive with EMS but blood pressures are remained stable here in the ED. Did receive IV fluid bolus. Sodium was slightly low at 124 and patient does have history of diabetes insipidus. Potassium 4.1. Creatinine 0.5 with BUN of 12. CO2 27 anion gap 9.   Patient was provided IV fluid bolus and repeat metabolic panel was performed that showed increase in glucose to 127. Chloride up to 93. Potassium 4.4. CO2 24 and anion gap of 10. With improvement of her labs, plan for discharge with outpatient follow-up. No indication for admission. Patient agreeable. Return instruction provided. Questions answered prior to discharge. For further details of the patient's emergency department visit, please see the advanced practice provider's documentation. Elvera Brunner, MD     This report has been produced using speech recognition software and may contain errors related to that system including errors in grammar, punctuation, and spelling, as well as words and phrases that may be inappropriate. If there are any questions or concerns please feel free to contact the dictating provider for clarification.        Elvera Brunner, MD  12/14/22 1946

## 2022-12-15 PROBLEM — E87.1 HYPONATREMIA: Status: ACTIVE | Noted: 2022-12-15

## 2022-12-15 LAB
EKG ATRIAL RATE: 65 BPM
EKG DIAGNOSIS: NORMAL
EKG P AXIS: -6 DEGREES
EKG P-R INTERVAL: 170 MS
EKG Q-T INTERVAL: 398 MS
EKG QRS DURATION: 94 MS
EKG QTC CALCULATION (BAZETT): 413 MS
EKG R AXIS: 17 DEGREES
EKG T AXIS: 11 DEGREES
EKG VENTRICULAR RATE: 65 BPM

## 2022-12-15 PROCEDURE — 93010 ELECTROCARDIOGRAM REPORT: CPT | Performed by: INTERNAL MEDICINE

## 2022-12-15 NOTE — DISCHARGE INSTRUCTIONS
Strict fluid restriction. Have an outpatient renal panel drawn and then call Dr. Flex Fernandez office tomorrow morning for a follow-up on Friday. · Baseline varies between 1 5-1 8/9, 1 77 today  · Monitor with diuresis  · Avoid hypotension, will continue BB but hold ACEI in acute HF    · Monitor

## 2023-01-30 PROBLEM — E87.1 HYPONATREMIA: Status: RESOLVED | Noted: 2022-12-15 | Resolved: 2023-01-30

## 2023-08-19 SDOH — HEALTH STABILITY: PHYSICAL HEALTH: ON AVERAGE, HOW MANY DAYS PER WEEK DO YOU ENGAGE IN MODERATE TO STRENUOUS EXERCISE (LIKE A BRISK WALK)?: 6 DAYS

## 2023-08-19 SDOH — HEALTH STABILITY: PHYSICAL HEALTH: ON AVERAGE, HOW MANY MINUTES DO YOU ENGAGE IN EXERCISE AT THIS LEVEL?: 50 MIN

## 2023-08-19 ASSESSMENT — SOCIAL DETERMINANTS OF HEALTH (SDOH)

## 2023-08-22 ENCOUNTER — OFFICE VISIT (OUTPATIENT)
Dept: ORTHOPEDIC SURGERY | Age: 68
End: 2023-08-22
Payer: MEDICARE

## 2023-08-22 VITALS — BODY MASS INDEX: 22.15 KG/M2 | WEIGHT: 125 LBS | HEIGHT: 63 IN

## 2023-08-22 DIAGNOSIS — M54.42 ACUTE LEFT-SIDED LOW BACK PAIN WITH LEFT-SIDED SCIATICA: ICD-10-CM

## 2023-08-22 DIAGNOSIS — M54.16 LEFT LUMBAR RADICULITIS: ICD-10-CM

## 2023-08-22 DIAGNOSIS — M54.50 LUMBAR PAIN: Primary | ICD-10-CM

## 2023-08-22 DIAGNOSIS — M43.16 SPONDYLOLISTHESIS OF LUMBAR REGION: ICD-10-CM

## 2023-08-22 PROCEDURE — 1123F ACP DISCUSS/DSCN MKR DOCD: CPT | Performed by: FAMILY MEDICINE

## 2023-08-22 PROCEDURE — 3017F COLORECTAL CA SCREEN DOC REV: CPT | Performed by: FAMILY MEDICINE

## 2023-08-22 PROCEDURE — 1036F TOBACCO NON-USER: CPT | Performed by: FAMILY MEDICINE

## 2023-08-22 PROCEDURE — G8420 CALC BMI NORM PARAMETERS: HCPCS | Performed by: FAMILY MEDICINE

## 2023-08-22 PROCEDURE — 99203 OFFICE O/P NEW LOW 30 MIN: CPT | Performed by: FAMILY MEDICINE

## 2023-08-22 PROCEDURE — G8399 PT W/DXA RESULTS DOCUMENT: HCPCS | Performed by: FAMILY MEDICINE

## 2023-08-22 PROCEDURE — G8427 DOCREV CUR MEDS BY ELIG CLIN: HCPCS | Performed by: FAMILY MEDICINE

## 2023-08-22 PROCEDURE — 1090F PRES/ABSN URINE INCON ASSESS: CPT | Performed by: FAMILY MEDICINE

## 2023-08-22 RX ORDER — METHYLPREDNISOLONE 4 MG/1
TABLET ORAL
Qty: 21 KIT | Refills: 0 | Status: SHIPPED | OUTPATIENT
Start: 2023-08-22

## 2023-08-22 RX ORDER — MELOXICAM 15 MG/1
15 TABLET ORAL DAILY
Qty: 30 TABLET | Refills: 3 | Status: SHIPPED | OUTPATIENT
Start: 2023-08-22

## 2023-08-22 NOTE — PATIENT INSTRUCTIONS
Take Medrol first for 6 days. This is a steroid pack. Flip the package over to the foil side and the directions will tell you to start with 6 pills the first day, 5 pills the second day, etc. Please do not take any other anti-inflammatories with the medrol dose elroy as this can upset your stomach. If something else is needed, you may take extra strength tylenol.      Once you are finished with the medrol, then you may start your anti-inflammatory: MELOXICAM 1X/DAY

## 2023-08-22 NOTE — PROGRESS NOTES
Chief Complaint  Back Pain (NP LUMBAR )      Initial consultation chronic mechanical low back pain with ongoing left leg pain    History of Present Illness:  Rob Cannon is a 76 y.o. 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 Nati Smith who is being seen today in kind consultation from Dr. Alvaro Licona for evaluation of longstanding lumbar and left leg pain. She states that she began to notice insidious onset of pain symptoms without history of fall or trauma in October 2022. There is no history of fall. She has been experiencing some achiness and was subsequently sent for x-rays at that time of her lumbar spine which is L3 4 degrees of scoliosis with multilevel lumbar degenerative changes with listhesis at L4-5. She was treated initially with physical therapy and occasional anti-inflammatories. She noticed only mild improvement of her symptoms and thankfully is not having great deal of rest or night pain but is having fairly consistent aching pain into her left leg primarily in the L5 distribution. She has not had lumbar imaging and is try to keep up with her exercise and stretching program.  She has been supplementing with Tylenol. She does deny neurogenic bowel or bladder symptoms. This is limiting her ability to be active and walk. Pain symptoms range between a 2-5 out of 10 with some achiness in the left leg with occasional burning. She reports little in the way of groin pain. She is being seen today for orthopedic and sports consultation with review of her previous imaging. Medical History  Patient's medications, allergies, past medical, surgical, social and family histories were reviewed and updated as appropriate.     Review of Systems  Pertinent items are noted in HPI  Review of systems reviewed from Patient History Form dated on 8/22/2023 and available in the

## 2023-09-06 ENCOUNTER — HOSPITAL ENCOUNTER (EMERGENCY)
Age: 68
Discharge: HOME | End: 2023-09-06
Attending: EMERGENCY MEDICINE
Payer: MEDICARE

## 2023-09-06 ENCOUNTER — OFFICE VISIT (OUTPATIENT)
Age: 68
End: 2023-09-06

## 2023-09-06 VITALS
DIASTOLIC BLOOD PRESSURE: 81 MMHG | HEART RATE: 64 BPM | TEMPERATURE: 98.3 F | OXYGEN SATURATION: 100 % | SYSTOLIC BLOOD PRESSURE: 129 MMHG | RESPIRATION RATE: 15 BRPM

## 2023-09-06 VITALS
WEIGHT: 125.8 LBS | TEMPERATURE: 98.6 F | DIASTOLIC BLOOD PRESSURE: 72 MMHG | SYSTOLIC BLOOD PRESSURE: 113 MMHG | HEIGHT: 63 IN | OXYGEN SATURATION: 96 % | HEART RATE: 77 BPM | BODY MASS INDEX: 22.29 KG/M2

## 2023-09-06 DIAGNOSIS — I80.02 THROMBOPHLEBITIS OF SUPERFICIAL VEINS OF LEFT LOWER EXTREMITY: ICD-10-CM

## 2023-09-06 DIAGNOSIS — L03.116 CELLULITIS OF LEFT LOWER LEG: Primary | ICD-10-CM

## 2023-09-06 DIAGNOSIS — M79.662 PAIN IN LEFT LOWER LEG: Primary | ICD-10-CM

## 2023-09-06 LAB
ANION GAP SERPL CALCULATED.3IONS-SCNC: 10 MMOL/L (ref 3–16)
BASOPHILS # BLD: 0 K/UL (ref 0–0.2)
BASOPHILS NFR BLD: 0.2 %
BUN SERPL-MCNC: 14 MG/DL (ref 7–20)
CALCIUM SERPL-MCNC: 9.6 MG/DL (ref 8.3–10.6)
CHLORIDE SERPL-SCNC: 98 MMOL/L (ref 99–110)
CO2 SERPL-SCNC: 25 MMOL/L (ref 21–32)
CREAT SERPL-MCNC: 0.6 MG/DL (ref 0.6–1.2)
DEPRECATED RDW RBC AUTO: 12.6 % (ref 12.4–15.4)
EOSINOPHIL # BLD: 0.1 K/UL (ref 0–0.6)
EOSINOPHIL NFR BLD: 0.8 %
GFR SERPLBLD CREATININE-BSD FMLA CKD-EPI: >60 ML/MIN/{1.73_M2}
GLUCOSE SERPL-MCNC: 94 MG/DL (ref 70–99)
HCT VFR BLD AUTO: 35.9 % (ref 36–48)
HGB BLD-MCNC: 12.3 G/DL (ref 12–16)
LACTATE BLDV-SCNC: 1.2 MMOL/L (ref 0.4–1.9)
LYMPHOCYTES # BLD: 1.2 K/UL (ref 1–5.1)
LYMPHOCYTES NFR BLD: 12.5 %
MCH RBC QN AUTO: 32.9 PG (ref 26–34)
MCHC RBC AUTO-ENTMCNC: 34.2 G/DL (ref 31–36)
MCV RBC AUTO: 96.1 FL (ref 80–100)
MONOCYTES # BLD: 0.9 K/UL (ref 0–1.3)
MONOCYTES NFR BLD: 9.1 %
NEUTROPHILS # BLD: 7.7 K/UL (ref 1.7–7.7)
NEUTROPHILS NFR BLD: 77.4 %
PLATELET # BLD AUTO: 255 K/UL (ref 135–450)
PMV BLD AUTO: 7.1 FL (ref 5–10.5)
POTASSIUM SERPL-SCNC: 4.7 MMOL/L (ref 3.5–5.1)
RBC # BLD AUTO: 3.74 M/UL (ref 4–5.2)
SODIUM SERPL-SCNC: 133 MMOL/L (ref 136–145)
WBC # BLD AUTO: 10 K/UL (ref 4–11)

## 2023-09-06 PROCEDURE — 83605 ASSAY OF LACTIC ACID: CPT

## 2023-09-06 PROCEDURE — 80048 BASIC METABOLIC PNL TOTAL CA: CPT

## 2023-09-06 PROCEDURE — 93971 EXTREMITY STUDY: CPT

## 2023-09-06 PROCEDURE — 85025 COMPLETE CBC W/AUTO DIFF WBC: CPT

## 2023-09-06 PROCEDURE — 99284 EMERGENCY DEPT VISIT MOD MDM: CPT

## 2023-09-06 RX ORDER — IBUPROFEN 600 MG/1
600 TABLET ORAL 3 TIMES DAILY PRN
Qty: 30 TABLET | Refills: 0 | Status: SHIPPED | OUTPATIENT
Start: 2023-09-06

## 2023-09-06 RX ORDER — CEPHALEXIN 500 MG/1
500 CAPSULE ORAL 2 TIMES DAILY
Qty: 14 CAPSULE | Refills: 0 | Status: SHIPPED | OUTPATIENT
Start: 2023-09-06 | End: 2023-09-13

## 2023-09-06 ASSESSMENT — ENCOUNTER SYMPTOMS
GASTROINTESTINAL NEGATIVE: 1
ABDOMINAL PAIN: 0
SHORTNESS OF BREATH: 0
SHORTNESS OF BREATH: 0
CHEST TIGHTNESS: 0
SORE THROAT: 0
COLOR CHANGE: 1
RESPIRATORY NEGATIVE: 1
COLOR CHANGE: 1

## 2023-09-06 NOTE — ED NOTES
D/C: Order noted for d/c. Pt confirmed d/c paperwork   have correct name. Discharge and education instructions reviewed with patient. Teach-back successful. Pt verbalized understanding and signed d/c papers. Pt denied questions at this time. No acute distress noted. Patient instructed to follow-up as noted - return to emergency department if symptoms worsen. Patient verbalized understanding. Discharged per EDMD with discharge instructions. Pt discharged per order to private vehicle. Patient stable upon departure. Thanked patient for choosing [facility] for care.           Chito Obregon RN  09/06/23 9999

## 2023-09-14 ENCOUNTER — OFFICE VISIT (OUTPATIENT)
Dept: ORTHOPEDIC SURGERY | Age: 68
End: 2023-09-14
Payer: MEDICARE

## 2023-09-14 DIAGNOSIS — M54.42 ACUTE LEFT-SIDED LOW BACK PAIN WITH LEFT-SIDED SCIATICA: ICD-10-CM

## 2023-09-14 DIAGNOSIS — M51.26 HNP (HERNIATED NUCLEUS PULPOSUS), LUMBAR: ICD-10-CM

## 2023-09-14 DIAGNOSIS — M54.16 LEFT LUMBAR RADICULITIS: ICD-10-CM

## 2023-09-14 DIAGNOSIS — M43.16 SPONDYLOLISTHESIS OF LUMBAR REGION: Primary | ICD-10-CM

## 2023-09-14 PROCEDURE — G8399 PT W/DXA RESULTS DOCUMENT: HCPCS | Performed by: FAMILY MEDICINE

## 2023-09-14 PROCEDURE — 1036F TOBACCO NON-USER: CPT | Performed by: FAMILY MEDICINE

## 2023-09-14 PROCEDURE — G8427 DOCREV CUR MEDS BY ELIG CLIN: HCPCS | Performed by: FAMILY MEDICINE

## 2023-09-14 PROCEDURE — G8420 CALC BMI NORM PARAMETERS: HCPCS | Performed by: FAMILY MEDICINE

## 2023-09-14 PROCEDURE — 1123F ACP DISCUSS/DSCN MKR DOCD: CPT | Performed by: FAMILY MEDICINE

## 2023-09-14 PROCEDURE — 1090F PRES/ABSN URINE INCON ASSESS: CPT | Performed by: FAMILY MEDICINE

## 2023-09-14 PROCEDURE — 3017F COLORECTAL CA SCREEN DOC REV: CPT | Performed by: FAMILY MEDICINE

## 2023-09-14 PROCEDURE — 99213 OFFICE O/P EST LOW 20 MIN: CPT | Performed by: FAMILY MEDICINE

## 2023-09-14 NOTE — PROGRESS NOTES
RESIDENT/ATTENDING ATTESTATION:    After medical student / resident evaluation / PA evaluation and exam, I independently gathered patients history, independently did a physical, and agree with A/P as written in medical student's note (other than clarified below). Please see below for additional information documented by the resident/attending including the A/P. Adriana Baez MD      Chief Complaint  Results (TR MRI LUMBAR )    Follow-up on chronic mechanical low back pain with ongoing left leg pain. MRI on 8/29/2023 revealed: L5-S1 multifactorial findings including severe left facet arthropathy, 4.5mm degenerative anterolisthesis and broad-based spondylotic disc protrusion with effacement of the left exiting L5 and abutment of the bilateral descending S1 nerve roots in lateral recess resulting in moderate central canal and moderate left greater than right foraminal narrowing, L4-L5 multifactorial findings including advanced left facet arthropathy, 3.5mm degenerative anterolisthesis and broad-based spondylotic disc protrusion with abutment of the left descending L5 nerve root resulting in moderate central canal and moderate left greater than right foraminal narrowing, and L3-L4 broad-based spondylotic disc protrusion with gentle abutment of the bilateral descending L4 nerve roots resulting in moderate central canal stenosis. Mild bilateral foraminal narrowing. History of Present Illness:  Ron Lugo is a 76 y.o. 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 Benedicto Mills who is being seen today in kind consultation from Dr. Johanna Beltran for evaluation of longstanding lumbar and left leg pain. She states that she began to notice insidious onset of pain symptoms without history of fall or trauma in October 2022. There is no history of fall.   She has been experiencing some achiness and

## 2023-09-26 ENCOUNTER — HOSPITAL ENCOUNTER (OUTPATIENT)
Dept: PHYSICAL THERAPY | Age: 68
Setting detail: THERAPIES SERIES
Discharge: HOME | End: 2023-09-26
Payer: MEDICARE

## 2023-09-26 PROCEDURE — 97112 NEUROMUSCULAR REEDUCATION: CPT | Performed by: PHYSICAL THERAPIST

## 2023-09-26 PROCEDURE — 97110 THERAPEUTIC EXERCISES: CPT | Performed by: PHYSICAL THERAPIST

## 2023-09-26 PROCEDURE — 97161 PT EVAL LOW COMPLEX 20 MIN: CPT | Performed by: PHYSICAL THERAPIST

## 2023-09-26 PROCEDURE — 97140 MANUAL THERAPY 1/> REGIONS: CPT | Performed by: PHYSICAL THERAPIST

## 2023-09-26 NOTE — PLAN OF CARE
to Hold Contraction >10 s [x] 1      _5_/10  Adapted from Quin Maciel al, Copyright 2009      Myotomes Normal Abnormal Comments   Hip flexion (L1-L2) Normal      Knee extension (L2-L4) Normal      Dorsiflexion (L4-L5) Normal      Great Toe Ext (L5) Normal      Ankle Eversion (S1-S2) Normal      Ankle PF(S1-S2) Normal          Dermatomes Normal Abnormal Comments   inguinal area (L1)  Normal      anterior mid-thigh (L2) Normal      distal ant thigh/med knee (L3) Normal      medial lower leg and foot (L4) Normal      lateral lower leg and foot (L5) Normal      posterior calf (S1) Normal      medial calcaneus (S2) Normal          Neural dynamic tension testing Normal Abnormal Comments   Slump Test  - Degree of knee flexion:       SLR       0-30      30-70      Femoral nerve (L2-4)        Reflexes Normal Abnormal Comments   S1-2 Seated achilles      S1-2 Prone knee bend      L3-4 Patellar tendon      C5-6 Biceps      C6 Brachioradialis      C7-8 Triceps      Clonus      Babinski      Garcia's        Joint mobility:    []Normal    [x]Hypo - SI and Lumbar spine   []Hyper    Palpation: TTP - L piriformis     Functional Mobility/Transfers: independent    Posture: WFL    Gait: (include devices/WB status) WFL        Classification driving today's treatment approach and dosing:  - Prevailing signs and symptoms consistent with:  []Symptom Modulation Approach   [] \"Directional preference subgroup\"    []Flexion Based      []age >50      []imaging evidence of lumbar stenosis     []preference for flexion     []Extension Based     []symptoms distal to buttock     []symptoms centralize with lumbar extension     []symptoms peripheralize with lumbar flexion     []directional preference for extension    []Lateral Shift     []visible frontal plane deviation     []directional preference for lateral translation   [] \"Manipulation subgroup\"  (3/4 meets manipulation criteria)     []symptoms less than 16 days    []FABQ less than

## 2023-09-26 NOTE — FLOWSHEET NOTE
[address] [place] [address]  Phone: [phone]   Fax: [phone]    Physical Therapy Treatment Note/ Progress Report:           Date:  2023    Patient Name:  Helen Pond    :  1955  MRN: [id number]  Restrictions/Precautions:    Medical/Treatment Diagnosis Information:  Diagnosis: Spondylolisthesis of lumbar region - M43.16; Left lumbar radiculitis - M54.16;  Acute left-sided low back pain with left-sided sciatica - M54.42; HNP (herniated nucleus pulposus), lumbar - M51.26  Treatment Diagnosis: decreased strength, endurance and high-level iADLs  Insurance/Certification information:  PT Insurance Information: Medicare  Physician Information:  Jayce Brown MD  Has the plan of care been signed (Y/N):        []  Yes  [x]  No     Date of Patient follow up with Physician:       Is this a Progress Report:     []  Yes  [x]  No          Progress report/ Recertification will be due (10 Rx or 30 days whichever is less): 24 Oct 2023           Visit # Insurance Allowable Auth Required   1 CAP (went to therapy earlier this year) []  Yes []  No        Functional Scale:   Modified Oswestry   - 24% disability         Latex Allergy:  []NO      [x]YES  Preferred Language for Healthcare:   [x]English       []other:      Pain level:  0-1/10     SUBJECTIVE:  See ev    OBJECTIVE: See ev  Observation:   Test measurements:      RESTRICTIONS/PRECAUTIONS: none      Exercises/Interventions:   Exercise Resistance/Repetitions Other comments   Stretching:      Hamstring stretch     Piriformis stretch- figure 4     Knee to opposite shoulder     Supine lower thoracic-lumbar rotation 3 x 10  Added    Sidelying thoracic rotation (open book)     Knee to chest     Standing IT band/side bend stretch     Seated on heels low back stretch     Prone prop on elbow     Prone extn to outstretched hands     Standing lumbar extn

## 2023-10-05 ENCOUNTER — HOSPITAL ENCOUNTER (OUTPATIENT)
Dept: PHYSICAL THERAPY | Age: 68
Setting detail: THERAPIES SERIES
Discharge: HOME | End: 2023-10-05
Payer: MEDICARE

## 2023-10-05 PROCEDURE — 97140 MANUAL THERAPY 1/> REGIONS: CPT | Performed by: PHYSICAL THERAPIST

## 2023-10-05 PROCEDURE — 97112 NEUROMUSCULAR REEDUCATION: CPT | Performed by: PHYSICAL THERAPIST

## 2023-10-05 PROCEDURE — 97110 THERAPEUTIC EXERCISES: CPT | Performed by: PHYSICAL THERAPIST

## 2023-10-05 NOTE — FLOWSHEET NOTE
[address] [place] [address]  Phone: [phone]   Fax: [phone]    Physical Therapy Treatment Note/ Progress Report:           Date:  10/5/2023    Patient Name:  Rema Bronson    :  1955  MRN: [id number]  Restrictions/Precautions:    Medical/Treatment Diagnosis Information:  Diagnosis: Spondylolisthesis of lumbar region - M43.16; Left lumbar radiculitis - M54.16; Acute left-sided low back pain with left-sided sciatica - M54.42; HNP (herniated nucleus pulposus), lumbar - M51.26  Treatment Diagnosis: decreased strength, endurance and high-level iADLs  Insurance/Certification information:  PT Insurance Information: Medicare  Physician Information:  Nancy Butt MD  Has the plan of care been signed (Y/N):        []  Yes  [x]  No     Date of Patient follow up with Physician:       Is this a Progress Report:     []  Yes  [x]  No          Progress report/ Recertification will be due (10 Rx or 30 days whichever is less): 24 Oct 2023           Visit # Insurance Allowable Auth Required   2 CAP (went to therapy earlier this year) []  Yes []  No        Functional Scale:   Modified Oswestry   - 24% disability         Latex Allergy:  []NO      [x]YES  Preferred Language for Healthcare:   [x]English       []other:      Pain level:  0-1/10     SUBJECTIVE: states she is having a little burning today in the L leg. She states pain has improved. She states burning is still intermittent. She states she was a little sore after the initial visit.    10/5    OBJECTIVE: See eval    Observation:   Test measurements:      RESTRICTIONS/PRECAUTIONS: none      Exercises/Interventions:   Exercise Resistance/Repetitions Other comments   Bike 5' Added 10/5   Stretching:      Hamstring stretch     Piriformis stretch- figure 4     Knee to opposite shoulder     Supine lower thoracic-lumbar rotation 3 x 10  Added    Sidelying thoracic

## 2023-10-09 ENCOUNTER — HOSPITAL ENCOUNTER (OUTPATIENT)
Dept: PHYSICAL THERAPY | Age: 68
Setting detail: THERAPIES SERIES
Discharge: HOME | End: 2023-10-09
Payer: MEDICARE

## 2023-10-09 PROCEDURE — 97112 NEUROMUSCULAR REEDUCATION: CPT | Performed by: PHYSICAL THERAPIST

## 2023-10-09 PROCEDURE — 97110 THERAPEUTIC EXERCISES: CPT | Performed by: PHYSICAL THERAPIST

## 2023-10-09 NOTE — FLOWSHEET NOTE
medical complications  [] Other: Pt anahy session well. Manual was held today due to pt's request. She demonstrated improved TA activation, being able to hold. Pt will work on HEP independently and return in 2 weeks for re-assessment and possible D/C. 10/9    Patient education: Patient education on PT and plan of care including diagnosis, prognosis, treatment goals and options. Patient agrees with discussed POC and treatment and is aware of rehab process. 9/26      PLAN: See eval 9/26  [] Continue per plan of care [] Alter current plan (see comments above)  [x] Plan of care initiated [] Hold pending MD visit [] Discharge      Electronically signed by:  Daphney Bazan, PT, DPT     Note: If patient does not return for scheduled/ recommended follow up visits, this note will serve as a discharge from care along with most recent update on progress.

## 2023-10-24 ENCOUNTER — HOSPITAL ENCOUNTER (OUTPATIENT)
Dept: PHYSICAL THERAPY | Age: 68
Setting detail: THERAPIES SERIES
Discharge: HOME | End: 2023-10-24
Payer: MEDICARE

## 2023-10-24 PROCEDURE — 97110 THERAPEUTIC EXERCISES: CPT | Performed by: PHYSICAL THERAPIST

## 2023-10-24 PROCEDURE — 97530 THERAPEUTIC ACTIVITIES: CPT | Performed by: PHYSICAL THERAPIST

## 2023-10-24 NOTE — DISCHARGE SUMMARY
Met: [] Not Met: [] Adjusted   2. Patient will have a decrease in pain to facilitate improvement in movement, function, and ADLs as indicated by Functional Deficits. [] Progressing: [x] Met: [] Not Met: [] Adjusted    Long Term Goals: To be achieved in: 6-8 weeks  1. Disability index score of 12% or less for the Modified oswestry  to assist with reaching prior level of function. [] Progressing: [x] Met: [] Not Met: [] Adjusted  2. Patient will demonstrate increased lumabr L SB AROM to [facility] to allow for proper joint functioning as indicated by patients Functional Deficits. [] Progressing: [x] Met: [] Not Met: [] Adjusted  3. Patient will demonstrate an increase in B hip strength (flex, ABD, and ext) strength to 4+/5 to help prevent symptoms while gardening. [x] Progressing: [] Met: [] Not Met: [] Adjusted  4. Patient will return to ADLs functional activities without increased symptoms or restriction. [] Progressing: [x] Met: [] Not Met: [] Adjusted  5. Pt will report standing for 30 minutes with no symptoms in order to cook dinner. [] Progressing: [x] Met: [] Not Met: [] Adjusted     Overall Progression Towards Functional goals/ Treatment Progress Update:  [x] Patient is progressing as expected towards functional goals listed. [] Progression is slowed due to complexities/Impairments listed. [] Progression has been slowed due to co-morbidities.   [] Plan just implemented, too soon to assess goals progression <30days   [] Goals require adjustment due to lack of progress  [] Patient is not progressing as expected and requires additional follow up with physician  [] Other    Prognosis for POC: [x] Good [] Fair  [] Poor      Patient requires continued skilled intervention: [x] Yes  [] No        Treatment/Activity Tolerance:  [] Patient tolerated treatment well [] Patient limited by fatique  [] Patient limited by pain  [] Patient limited by other medical complications  [] Other: Pt presents to PT with

## 2024-06-17 SDOH — HEALTH STABILITY: PHYSICAL HEALTH: ON AVERAGE, HOW MANY MINUTES DO YOU ENGAGE IN EXERCISE AT THIS LEVEL?: 40 MIN

## 2024-06-17 SDOH — HEALTH STABILITY: PHYSICAL HEALTH: ON AVERAGE, HOW MANY DAYS PER WEEK DO YOU ENGAGE IN MODERATE TO STRENUOUS EXERCISE (LIKE A BRISK WALK)?: 7 DAYS

## 2024-06-20 ENCOUNTER — OFFICE VISIT (OUTPATIENT)
Dept: INTERNAL MEDICINE CLINIC | Age: 69
End: 2024-06-20

## 2024-06-20 VITALS
DIASTOLIC BLOOD PRESSURE: 76 MMHG | BODY MASS INDEX: 23.03 KG/M2 | SYSTOLIC BLOOD PRESSURE: 121 MMHG | WEIGHT: 130 LBS | HEART RATE: 60 BPM | OXYGEN SATURATION: 99 %

## 2024-06-20 DIAGNOSIS — E22.2 SIADH (SYNDROME OF INAPPROPRIATE ADH PRODUCTION) (HCC): Primary | ICD-10-CM

## 2024-06-20 LAB
ANION GAP SERPL CALCULATED.3IONS-SCNC: 9 MMOL/L (ref 3–16)
BUN SERPL-MCNC: 24 MG/DL (ref 7–20)
CALCIUM SERPL-MCNC: 9.8 MG/DL (ref 8.3–10.6)
CHLORIDE SERPL-SCNC: 98 MMOL/L (ref 99–110)
CO2 SERPL-SCNC: 29 MMOL/L (ref 21–32)
CREAT SERPL-MCNC: <0.5 MG/DL (ref 0.6–1.2)
GFR SERPLBLD CREATININE-BSD FMLA CKD-EPI: >90 ML/MIN/{1.73_M2}
GLUCOSE SERPL-MCNC: 95 MG/DL (ref 70–99)
POTASSIUM SERPL-SCNC: 4.8 MMOL/L (ref 3.5–5.1)
SODIUM SERPL-SCNC: 136 MMOL/L (ref 136–145)

## 2024-06-20 RX ORDER — OMEPRAZOLE 20 MG/1
20 CAPSULE, DELAYED RELEASE ORAL
Qty: 90 CAPSULE | Refills: 3 | Status: SHIPPED | OUTPATIENT
Start: 2024-06-20

## 2024-06-20 NOTE — PROGRESS NOTES
[facility] Internal Medicine  Clinic Progress note:    Jailyn Mcelroy is a 68 y.o. female with the past medical history as listed below presenting to the clinic for new patient visit. She has followed with Dr. Jimenes.    Chief Complaint   Patient presents with    New Patient     Hx of SIADH: Patient is on fluid restriction. She is fluid restricted to 48 oz. She has syncope and low sodium in the past. She had testing to confirm SIADH with Dr. Jimenes    Hx of epilepsy: Patient's last seizure was in 2006.    Hypertension: Patient has complaint with benicar. She has no headaches and no blurred vision          Past Medical History:   Diagnosis Date    COVID 08/31/2022    had again 9.2023    Epilepsy (HCC) 1989    generalized seizure d/o Dr Jeffery     Family history of colonic polyps     father had adenomatous polyps    Hyperlipidemia     Hypertension     Migraines     stopped after menopause age 50    Osteopenia of multiple sites     frax score 11% and 2 % next dexa due June 2025    SIADH (syndrome of inappropriate ADH production) (HCC)     48 oz fluid rest    Sleep apnea     cpap    Vasovagal syncope 08/2021    admitted to [facility] and , was negative wasnt orthostatic at the time       Past Surgical History:   Procedure Laterality Date    NM COLONOSCOPY FLX DX W/COLLJ SPEC WHEN PFRMD N/A 11/28/2018    COLONOSCOPY DIAGNOSTIC OR SCREENING performed by Jesus Salgado MD at [facility] ENDOSCOPY    TONSILLECTOMY  as a child       Social History     Socioeconomic History    Marital status:      Spouse name: Not on file    Number of children: 3    Years of education: Not on file    Highest education level: Not on file   Occupational History    Occupation: retired finance at [employer]     Comment: second marriage 3 step children   Tobacco Use    Smoking status: Never    Smokeless tobacco: Never    Tobacco comments:     father smoked a pipe   Substance and Sexual Activity    Alcohol use: No    Drug use: No    Sexual activity: Not on

## 2024-06-26 DIAGNOSIS — M54.50 LUMBAR PAIN: ICD-10-CM

## 2024-06-26 DIAGNOSIS — M54.42 ACUTE LEFT-SIDED LOW BACK PAIN WITH LEFT-SIDED SCIATICA: ICD-10-CM

## 2024-06-26 DIAGNOSIS — M43.16 SPONDYLOLISTHESIS OF LUMBAR REGION: ICD-10-CM

## 2024-06-26 DIAGNOSIS — M54.16 LEFT LUMBAR RADICULITIS: ICD-10-CM

## 2024-06-26 RX ORDER — MELOXICAM 15 MG/1
15 TABLET ORAL DAILY
Qty: 30 TABLET | Refills: 3 | OUTPATIENT
Start: 2024-06-26

## 2024-11-03 NOTE — ED PROVIDER NOTES
for treatment of possible cellulitis as well as instructions to take ibuprofen and compression of the leg. [SC]      ED Course User Index  [SC] Gonsalo Espinal MD       Patient was given the following medications:  Medications - No data to display    Disposition Considerations (tests considered but not done, Shared Decision Making, Pt Expectation of Test or Tx.): Shared decision making used for discharge. Considered deep space infection, necrotizing fasciitis, abscess, pyomyositis. Patient has no signs systemic sepsis, lactate normal, white blood cell count normal.         Appropriate for outpatient management      The patient is at low risk for mortality based on demographic, history and clinical factors. Given the best available information and clinical assessment, I estimate the risk of hospitalization to be greater than risk of treatment at home. I have explained to the patient that the risk could rapidly change, given precautions for return and instructions. Explained to patient that the risk for mortality is low based on demographic, history and clinical factors. I discussed with patient the results of evaluation in the ED, diagnosis, care, and prognosis. The plan is to discharge to home. Patient is in agreement with plan and questions have been answered. I also discussed with patient the reasons which may require a return visit and the importance of follow-up care. The patient is well-appearing, nontoxic, and improved at the time of discharge. Patient agrees to call to arrange follow-up care as directed. Patient understands to return immediately for worsening/change in symptoms. I am the Primary Clinician of Record. I PERSONALLY SAW THE PATIENT AND PERFORMED A SUBSTANTIVE PORTION OF THE VISIT INCLUDING ALL ASPECTS OF THE MEDICAL DECISION MAKING PROCESS. FINAL IMPRESSION      1. Cellulitis of left lower leg    2.  Thrombophlebitis of superficial veins of left lower extremity
In 1-3 sessions pt will be able to transfer with min A and appropriate assistive device

## 2024-12-30 ENCOUNTER — OFFICE VISIT (OUTPATIENT)
Age: 69
End: 2024-12-30

## 2024-12-30 VITALS
OXYGEN SATURATION: 97 % | HEART RATE: 73 BPM | DIASTOLIC BLOOD PRESSURE: 75 MMHG | BODY MASS INDEX: 22.86 KG/M2 | SYSTOLIC BLOOD PRESSURE: 118 MMHG | TEMPERATURE: 98.2 F | WEIGHT: 129 LBS

## 2024-12-30 DIAGNOSIS — L23.9 ALLERGIC DERMATITIS: Primary | ICD-10-CM

## 2024-12-30 RX ORDER — PREDNISONE 10 MG/1
TABLET ORAL
Qty: 20 TABLET | Refills: 0 | Status: SHIPPED | OUTPATIENT
Start: 2024-12-30 | End: 2025-01-07

## 2024-12-30 RX ORDER — DIPHENHYDRAMINE HCL 25 MG
25 CAPSULE ORAL EVERY 6 HOURS PRN
Qty: 40 CAPSULE | Refills: 0 | Status: SHIPPED | OUTPATIENT
Start: 2024-12-30 | End: 2025-01-09

## 2024-12-30 RX ORDER — TRIAMCINOLONE ACETONIDE 1 MG/G
OINTMENT TOPICAL 2 TIMES DAILY
Qty: 30 G | Refills: 0 | Status: SHIPPED | OUTPATIENT
Start: 2024-12-30 | End: 2025-01-06

## 2024-12-30 ASSESSMENT — ENCOUNTER SYMPTOMS
SHORTNESS OF BREATH: 0
COUGH: 0
GASTROINTESTINAL NEGATIVE: 1
CHEST TIGHTNESS: 0
WHEEZING: 0

## 2024-12-30 NOTE — PROGRESS NOTES
2024     Jailyn Mcelroy (:  1955) is a 69 y.o. female, here for evaluation of the following medical concerns:    Chief Complaint   Patient presents with    Rash     Face eyes sxs 1 day     Having some redness and swelling to bilateral eyes and mouth for two days.  Has not changed anything in soaps or diet, no other new exposures.  Some itching.  Denies tongue, throat swelling or difficulty breathing.    Review of Systems   Constitutional:  Negative for chills, diaphoresis, fatigue and fever.   Respiratory:  Negative for cough, chest tightness, shortness of breath and wheezing.    Cardiovascular:  Negative for chest pain and palpitations.   Gastrointestinal: Negative.    Genitourinary: Negative.    Skin:  Positive for rash.   Neurological:  Negative for dizziness, weakness and headaches.       Prior to Visit Medications    Medication Sig Taking? Authorizing Provider   predniSONE (DELTASONE) 10 MG tablet Take 4 tablets by mouth daily for 2 days, THEN 3 tablets daily for 2 days, THEN 2 tablets daily for 2 days, THEN 1 tablet daily for 2 days. Yes Mei Tran APRN - CNP   triamcinolone (KENALOG) 0.1 % ointment Apply topically 2 times daily for 7 days Yes Mei Tran APRN - CNP   diphenhydrAMINE (BENADRYL ALLERGY) 25 MG capsule Take 1 capsule by mouth every 6 hours as needed for Itching or Allergies Yes Mei Tran APRN - CNP   olmesartan (BENICAR) 5 MG tablet TAKE 1 TABLET BY MOUTH EVERY DAY IN THE MORNING Yes Eliseo Boykin MD   atorvastatin (LIPITOR) 10 MG tablet TAKE 1 TABLET BY MOUTH EVERY DAY Yes Eliseo Boykin MD   omeprazole (PRILOSEC) 20 MG delayed release capsule Take 1 capsule by mouth every morning (before breakfast) Yes Eliseo Boykin MD   Cholecalciferol (VITAMIN D) 2000 units CAPS capsule Take 1 capsule by mouth daily Yes Shaq Barrientos MD   CARBATROL 300 MG CR capsule Take 1 capsule by mouth 2 times daily. Yes Iglesia Brady MD   Multiple Vitamin

## 2025-02-04 SDOH — HEALTH STABILITY: PHYSICAL HEALTH: ON AVERAGE, HOW MANY DAYS PER WEEK DO YOU ENGAGE IN MODERATE TO STRENUOUS EXERCISE (LIKE A BRISK WALK)?: 5 DAYS

## 2025-02-04 SDOH — HEALTH STABILITY: PHYSICAL HEALTH: ON AVERAGE, HOW MANY MINUTES DO YOU ENGAGE IN EXERCISE AT THIS LEVEL?: 40 MIN

## 2025-02-04 ASSESSMENT — PATIENT HEALTH QUESTIONNAIRE - PHQ9
2. FEELING DOWN, DEPRESSED OR HOPELESS: NOT AT ALL
SUM OF ALL RESPONSES TO PHQ QUESTIONS 1-9: 0
SUM OF ALL RESPONSES TO PHQ9 QUESTIONS 1 & 2: 0
1. LITTLE INTEREST OR PLEASURE IN DOING THINGS: NOT AT ALL
SUM OF ALL RESPONSES TO PHQ QUESTIONS 1-9: 0

## 2025-02-04 ASSESSMENT — LIFESTYLE VARIABLES
HOW OFTEN DO YOU HAVE A DRINK CONTAINING ALCOHOL: 2
HOW OFTEN DO YOU HAVE SIX OR MORE DRINKS ON ONE OCCASION: 1
HOW OFTEN DO YOU HAVE A DRINK CONTAINING ALCOHOL: MONTHLY OR LESS
HOW MANY STANDARD DRINKS CONTAINING ALCOHOL DO YOU HAVE ON A TYPICAL DAY: 1 OR 2
HOW MANY STANDARD DRINKS CONTAINING ALCOHOL DO YOU HAVE ON A TYPICAL DAY: 1

## 2025-02-07 SDOH — ECONOMIC STABILITY: FOOD INSECURITY: WITHIN THE PAST 12 MONTHS, THE FOOD YOU BOUGHT JUST DIDN'T LAST AND YOU DIDN'T HAVE MONEY TO GET MORE.: PATIENT DECLINED

## 2025-02-07 SDOH — ECONOMIC STABILITY: TRANSPORTATION INSECURITY
IN THE PAST 12 MONTHS, HAS THE LACK OF TRANSPORTATION KEPT YOU FROM MEDICAL APPOINTMENTS OR FROM GETTING MEDICATIONS?: PATIENT DECLINED

## 2025-02-07 SDOH — ECONOMIC STABILITY: INCOME INSECURITY: IN THE LAST 12 MONTHS, WAS THERE A TIME WHEN YOU WERE NOT ABLE TO PAY THE MORTGAGE OR RENT ON TIME?: PATIENT DECLINED

## 2025-02-07 SDOH — ECONOMIC STABILITY: FOOD INSECURITY: WITHIN THE PAST 12 MONTHS, YOU WORRIED THAT YOUR FOOD WOULD RUN OUT BEFORE YOU GOT MONEY TO BUY MORE.: PATIENT DECLINED

## 2025-02-07 SDOH — ECONOMIC STABILITY: TRANSPORTATION INSECURITY
IN THE PAST 12 MONTHS, HAS LACK OF TRANSPORTATION KEPT YOU FROM MEETINGS, WORK, OR FROM GETTING THINGS NEEDED FOR DAILY LIVING?: PATIENT DECLINED

## 2025-02-10 ENCOUNTER — OFFICE VISIT (OUTPATIENT)
Dept: INTERNAL MEDICINE CLINIC | Age: 70
End: 2025-02-10

## 2025-02-10 VITALS
OXYGEN SATURATION: 99 % | BODY MASS INDEX: 22.33 KG/M2 | SYSTOLIC BLOOD PRESSURE: 120 MMHG | HEART RATE: 67 BPM | DIASTOLIC BLOOD PRESSURE: 76 MMHG | WEIGHT: 126 LBS

## 2025-02-10 DIAGNOSIS — R73.09 ELEVATED GLUCOSE: ICD-10-CM

## 2025-02-10 DIAGNOSIS — Z00.00 MEDICARE ANNUAL WELLNESS VISIT, SUBSEQUENT: Primary | ICD-10-CM

## 2025-02-10 DIAGNOSIS — E78.00 HIGH CHOLESTEROL: ICD-10-CM

## 2025-02-10 DIAGNOSIS — E55.9 VITAMIN D DEFICIENCY: ICD-10-CM

## 2025-02-10 NOTE — PROGRESS NOTES
Medicare Annual Wellness Visit    Jailyn Mcelroy is here for Medicare AWV    Assessment & Plan   Medicare annual wellness visit, subsequent     No follow-ups on file.     Subjective       Patient's complete Health Risk Assessment and screening values have been reviewed and are found in Flowsheets. The following problems were reviewed today and where indicated follow up appointments were made and/or referrals ordered.    No Positive Risk Factors identified today.                                    Objective   Vitals:    02/10/25 1041   BP: 120/76   Site: Left Upper Arm   Position: Sitting   Cuff Size: Medium Adult   Pulse: 67   SpO2: 99%   Weight: 57.2 kg (126 lb)      Body mass index is 22.33 kg/m².                    Allergies   Allergen Reactions    Latex Rash    Erythromycin      Prior to Visit Medications    Medication Sig Taking? Authorizing Provider   olmesartan (BENICAR) 5 MG tablet TAKE 1 TABLET BY MOUTH EVERY DAY IN THE MORNING Yes Eliseo Boykin MD   atorvastatin (LIPITOR) 10 MG tablet TAKE 1 TABLET BY MOUTH EVERY DAY Yes Eliseo Boykin MD   omeprazole (PRILOSEC) 20 MG delayed release capsule Take 1 capsule by mouth every morning (before breakfast) Yes Eliseo Boykin MD   Cholecalciferol (VITAMIN D) 2000 units CAPS capsule Take 1 capsule by mouth daily Yes ProviderShaq MD   CARBATROL 300 MG CR capsule Take 1 capsule by mouth 2 times daily. Yes Iglesia Brady MD   Multiple Vitamin (MULTIVITAMIN PO) Take  by mouth.   Yes ProviderShaq MD   meloxicam (MOBIC) 15 MG tablet Take 1 tablet by mouth daily  Patient not taking: Reported on 2/10/2025  Denis Stevens MD       [facility] (Including outside providers/suppliers regularly involved in providing care):   Patient Care Team:  Eliseo Boykin MD as PCP - General (Internal Medicine)  Eliseo Boykin MD as PCP - Empaneled Provider     Recommendations for Preventive Services Due: see orders and patient instructions/AVS.  Recommended

## 2025-02-11 LAB
25(OH)D3 SERPL-MCNC: 47.6 NG/ML
ALBUMIN SERPL-MCNC: 4.8 G/DL (ref 3.4–5)
ALBUMIN/GLOB SERPL: 2 {RATIO} (ref 1.1–2.2)
ALP SERPL-CCNC: 83 U/L (ref 40–129)
ALT SERPL-CCNC: 16 U/L (ref 10–40)
ANION GAP SERPL CALCULATED.3IONS-SCNC: 10 MMOL/L (ref 3–16)
AST SERPL-CCNC: 22 U/L (ref 15–37)
BASOPHILS # BLD: 0.1 K/UL (ref 0–0.2)
BASOPHILS NFR BLD: 1.6 %
BILIRUB SERPL-MCNC: 0.3 MG/DL (ref 0–1)
BUN SERPL-MCNC: 15 MG/DL (ref 7–20)
CALCIUM SERPL-MCNC: 9.8 MG/DL (ref 8.3–10.6)
CHLORIDE SERPL-SCNC: 93 MMOL/L (ref 99–110)
CHOLEST SERPL-MCNC: 266 MG/DL (ref 0–199)
CO2 SERPL-SCNC: 27 MMOL/L (ref 21–32)
CREAT SERPL-MCNC: 0.6 MG/DL (ref 0.6–1.2)
DEPRECATED RDW RBC AUTO: 12.6 % (ref 12.4–15.4)
EOSINOPHIL # BLD: 0.1 K/UL (ref 0–0.6)
EOSINOPHIL NFR BLD: 1.4 %
EST. AVERAGE GLUCOSE BLD GHB EST-MCNC: 111.2 MG/DL
GFR SERPLBLD CREATININE-BSD FMLA CKD-EPI: >90 ML/MIN/{1.73_M2}
GLUCOSE SERPL-MCNC: 95 MG/DL (ref 70–99)
HBA1C MFR BLD: 5.5 %
HCT VFR BLD AUTO: 38.7 % (ref 36–48)
HDLC SERPL-MCNC: 100 MG/DL (ref 40–60)
HGB BLD-MCNC: 13.6 G/DL (ref 12–16)
LDLC SERPL CALC-MCNC: 145 MG/DL
LYMPHOCYTES # BLD: 1.5 K/UL (ref 1–5.1)
LYMPHOCYTES NFR BLD: 31.8 %
MCH RBC QN AUTO: 33.4 PG (ref 26–34)
MCHC RBC AUTO-ENTMCNC: 35.2 G/DL (ref 31–36)
MCV RBC AUTO: 94.9 FL (ref 80–100)
MONOCYTES # BLD: 0.4 K/UL (ref 0–1.3)
MONOCYTES NFR BLD: 9 %
NEUTROPHILS # BLD: 2.6 K/UL (ref 1.7–7.7)
NEUTROPHILS NFR BLD: 56.2 %
PLATELET # BLD AUTO: 282 K/UL (ref 135–450)
PMV BLD AUTO: 7.7 FL (ref 5–10.5)
POTASSIUM SERPL-SCNC: 4.7 MMOL/L (ref 3.5–5.1)
PROT SERPL-MCNC: 7.2 G/DL (ref 6.4–8.2)
RBC # BLD AUTO: 4.08 M/UL (ref 4–5.2)
SODIUM SERPL-SCNC: 130 MMOL/L (ref 136–145)
TRIGL SERPL-MCNC: 105 MG/DL (ref 0–150)
VLDLC SERPL CALC-MCNC: 21 MG/DL
WBC # BLD AUTO: 4.7 K/UL (ref 4–11)

## 2025-02-18 ENCOUNTER — PATIENT MESSAGE (OUTPATIENT)
Dept: INTERNAL MEDICINE CLINIC | Age: 70
End: 2025-02-18

## 2025-02-18 DIAGNOSIS — E87.1 HYPONATREMIA: Primary | ICD-10-CM

## 2025-02-24 ENCOUNTER — LAB (OUTPATIENT)
Dept: INTERNAL MEDICINE CLINIC | Age: 70
End: 2025-02-24
Payer: MEDICARE

## 2025-02-24 DIAGNOSIS — E87.1 HYPONATREMIA: ICD-10-CM

## 2025-02-24 LAB
ANION GAP SERPL CALCULATED.3IONS-SCNC: 8 MMOL/L (ref 3–16)
BUN SERPL-MCNC: 19 MG/DL (ref 7–20)
CALCIUM SERPL-MCNC: 9.6 MG/DL (ref 8.3–10.6)
CHLORIDE SERPL-SCNC: 98 MMOL/L (ref 99–110)
CO2 SERPL-SCNC: 28 MMOL/L (ref 21–32)
CREAT SERPL-MCNC: 0.6 MG/DL (ref 0.6–1.2)
GFR SERPLBLD CREATININE-BSD FMLA CKD-EPI: >90 ML/MIN/{1.73_M2}
GLUCOSE SERPL-MCNC: 80 MG/DL (ref 70–99)
POTASSIUM SERPL-SCNC: 4.2 MMOL/L (ref 3.5–5.1)
SODIUM SERPL-SCNC: 134 MMOL/L (ref 136–145)

## 2025-02-24 PROCEDURE — 36415 COLL VENOUS BLD VENIPUNCTURE: CPT | Performed by: STUDENT IN AN ORGANIZED HEALTH CARE EDUCATION/TRAINING PROGRAM

## 2025-03-23 ENCOUNTER — OFFICE VISIT (OUTPATIENT)
Age: 70
End: 2025-03-23

## 2025-03-23 VITALS
WEIGHT: 131 LBS | TEMPERATURE: 98.3 F | HEIGHT: 63 IN | SYSTOLIC BLOOD PRESSURE: 120 MMHG | OXYGEN SATURATION: 94 % | BODY MASS INDEX: 23.21 KG/M2 | HEART RATE: 78 BPM | DIASTOLIC BLOOD PRESSURE: 77 MMHG

## 2025-03-23 DIAGNOSIS — J10.1 INFLUENZA A: Primary | ICD-10-CM

## 2025-03-23 DIAGNOSIS — Z20.828 EXPOSURE TO THE FLU: ICD-10-CM

## 2025-03-23 LAB
INFLUENZA A ANTIGEN, POC: POSITIVE
INFLUENZA B ANTIGEN, POC: NEGATIVE

## 2025-03-23 RX ORDER — OSELTAMIVIR PHOSPHATE 75 MG/1
75 CAPSULE ORAL 2 TIMES DAILY
Qty: 10 CAPSULE | Refills: 0 | Status: SHIPPED | OUTPATIENT
Start: 2025-03-23 | End: 2025-03-28

## 2025-05-30 NOTE — TELEPHONE ENCOUNTER
Future Appointments   Date Time Provider Department Center   8/11/2025 10:00 AM Eliseo Boykin MD [facility] E[facility] DEP

## 2025-06-01 RX ORDER — OMEPRAZOLE 20 MG/1
20 CAPSULE, DELAYED RELEASE ORAL
Qty: 90 CAPSULE | Refills: 3 | Status: SHIPPED | OUTPATIENT
Start: 2025-06-01

## 2025-06-01 RX ORDER — OLMESARTAN MEDOXOMIL 5 MG/1
5 TABLET ORAL EVERY MORNING
Qty: 90 TABLET | Refills: 1 | Status: SHIPPED | OUTPATIENT
Start: 2025-06-01

## 2025-08-11 ENCOUNTER — OFFICE VISIT (OUTPATIENT)
Dept: INTERNAL MEDICINE CLINIC | Age: 70
End: 2025-08-11

## 2025-08-11 VITALS
OXYGEN SATURATION: 95 % | HEART RATE: 67 BPM | SYSTOLIC BLOOD PRESSURE: 108 MMHG | WEIGHT: 128 LBS | BODY MASS INDEX: 22.67 KG/M2 | DIASTOLIC BLOOD PRESSURE: 60 MMHG

## 2025-08-11 DIAGNOSIS — G40.309 EPILEPSY, GENERALIZED, CONVULSIVE (HCC): Primary | ICD-10-CM

## 2025-08-11 DIAGNOSIS — Z00.00 PREVENTATIVE HEALTH CARE: ICD-10-CM

## 2025-08-11 DIAGNOSIS — E78.00 HIGH CHOLESTEROL: ICD-10-CM

## 2025-08-11 DIAGNOSIS — G47.33 OSA ON CPAP: ICD-10-CM

## 2025-08-11 DIAGNOSIS — E78.00 PURE HYPERCHOLESTEROLEMIA: ICD-10-CM

## 2025-08-11 DIAGNOSIS — I10 PRIMARY HYPERTENSION: ICD-10-CM

## 2025-08-11 RX ORDER — ATORVASTATIN CALCIUM 20 MG/1
20 TABLET, FILM COATED ORAL DAILY
Qty: 90 TABLET | Refills: 3 | Status: SHIPPED | OUTPATIENT
Start: 2025-08-11